# Patient Record
Sex: MALE | Race: WHITE | Employment: OTHER | URBAN - METROPOLITAN AREA
[De-identification: names, ages, dates, MRNs, and addresses within clinical notes are randomized per-mention and may not be internally consistent; named-entity substitution may affect disease eponyms.]

---

## 2019-03-18 ENCOUNTER — OFFICE VISIT (OUTPATIENT)
Dept: ENT CLINIC | Age: 79
End: 2019-03-18
Payer: MEDICARE

## 2019-03-18 VITALS
HEIGHT: 71 IN | DIASTOLIC BLOOD PRESSURE: 58 MMHG | WEIGHT: 212.8 LBS | TEMPERATURE: 97.2 F | HEART RATE: 81 BPM | SYSTOLIC BLOOD PRESSURE: 118 MMHG | BODY MASS INDEX: 29.79 KG/M2

## 2019-03-18 DIAGNOSIS — H61.23 BILATERAL IMPACTED CERUMEN: Primary | ICD-10-CM

## 2019-03-18 DIAGNOSIS — H90.2 EXTERNAL EAR CONDUCTIVE HEARING LOSS: ICD-10-CM

## 2019-03-18 PROCEDURE — 69210 REMOVE IMPACTED EAR WAX UNI: CPT | Performed by: OTOLARYNGOLOGY

## 2019-03-18 RX ORDER — GLIPIZIDE 10 MG/1
10 TABLET ORAL DAILY
COMMUNITY

## 2019-03-20 RX ORDER — CEFAZOLIN SODIUM 2 G/50ML
2 SOLUTION INTRAVENOUS ONCE
Status: CANCELLED | OUTPATIENT
Start: 2019-03-26 | End: 2019-03-20

## 2019-03-20 NOTE — PROGRESS NOTES
[facility] / [facility] ([facility]) [address] [facility], [address]    Acknowledgment of Informed Consent for Surgical or Medical Procedure and Sedation  I agree to allow doctor(s) [id number] Kaufman and his/her associates or assistants, including residents and/or other qualified medical practitioner to perform the following medical treatment or procedure and to administer or direct the administration of sedation as necessary:  Procedure(s): LEFT TOTAL KNEE ARTHROPLASTY  My doctor has explained the following regarding the proposed procedure:   the explanation of the procedure   the benefits of the procedure   the potential problems that might occur during recuperation   the risks and side effects of the procedure which could include but are not limited to severe blood loss, infection, stroke or death   the benefits, risks and side effect of alternative procedures including the consequences of declining this procedure or any alternative procedures   the likelihood of achieving satisfactory results. I acknowledge no guarantee or assurance has been made to me regarding the results. I understand that during the course of this treatment/procedure, unforeseen conditions can occur which require an additional or different procedure. I agree to allow my physician or assistants to perform such extension of the original procedure as they may find necessary. I understand that sedation will often result in temporary impairment of memory and fine motor skills and that sedation can occasionally progress to a state of deep sedation or general anesthesia. I understand the risks of anesthesia for surgery include, but are not limited to, sore throat, hoarseness, injury to face, mouth, or teeth; nausea; headache; injury to blood vessels or nerves; death, brain damage, or paralysis.     I understand that if I have a Limitation of Treatment order in effect during my hospitalization, the order may or may not be in effect during this procedure. I give my doctor permission to give me blood or blood products. I understand that there are risks with receiving blood such as hepatitis, AIDS, fever, or allergic reaction. I acknowledge that the risks, benefits, and alternatives of this treatment have been explained to me and that no express or implied warranty has been given by the hospital, any blood bank, or any person or entity as to the blood or blood components transfused. At the discretion of my doctor, I agree to allow observers, equipment/product representatives and allow photographing, and/or televising of the procedure, provided my name or identity is maintained confidentially. I agree the hospital may dispose of or use for scientific or educational purposes any tissue, fluid, or body parts which may be removed.     ________________________________Date________Time______ am/pm  ([facility])  Patient or Signature of Closest Relative or Legal Guardian    ________________________________Date________Time______am/pm      Page 1 of  1  Witness

## 2019-03-21 ENCOUNTER — HOSPITAL ENCOUNTER (OUTPATIENT)
Dept: PREADMISSION TESTING | Age: 79
Discharge: HOME | End: 2019-03-25
Payer: MEDICARE

## 2019-03-21 VITALS
HEART RATE: 63 BPM | HEIGHT: 72 IN | WEIGHT: 208 LBS | OXYGEN SATURATION: 96 % | DIASTOLIC BLOOD PRESSURE: 68 MMHG | BODY MASS INDEX: 28.17 KG/M2 | RESPIRATION RATE: 16 BRPM | TEMPERATURE: 98.8 F | SYSTOLIC BLOOD PRESSURE: 113 MMHG

## 2019-03-21 LAB
A/G RATIO: 1.5 (ref 1.1–2.2)
ALBUMIN SERPL-MCNC: 4.1 G/DL (ref 3.4–5)
ALP BLD-CCNC: 77 U/L (ref 40–129)
ALT SERPL-CCNC: 20 U/L (ref 10–40)
ANION GAP SERPL CALCULATED.3IONS-SCNC: 12 MMOL/L (ref 3–16)
APTT: 35.1 SEC (ref 26–36)
AST SERPL-CCNC: 15 U/L (ref 15–37)
BASOPHILS ABSOLUTE: 0.1 K/UL (ref 0–0.2)
BASOPHILS RELATIVE PERCENT: 0.8 %
BILIRUB SERPL-MCNC: 0.9 MG/DL (ref 0–1)
BUN BLDV-MCNC: 30 MG/DL (ref 7–20)
CALCIUM SERPL-MCNC: 9.6 MG/DL (ref 8.3–10.6)
CHLORIDE BLD-SCNC: 100 MMOL/L (ref 99–110)
CO2: 24 MMOL/L (ref 21–32)
CREAT SERPL-MCNC: 1 MG/DL (ref 0.8–1.3)
EOSINOPHILS ABSOLUTE: 0.3 K/UL (ref 0–0.6)
EOSINOPHILS RELATIVE PERCENT: 3.3 %
GFR AFRICAN AMERICAN: >60
GFR NON-AFRICAN AMERICAN: >60
GLOBULIN: 2.8 G/DL
GLUCOSE BLD-MCNC: 208 MG/DL (ref 70–99)
HCT VFR BLD CALC: 41.5 % (ref 40.5–52.5)
HEMOGLOBIN: 13.7 G/DL (ref 13.5–17.5)
INR BLD: 0.98 (ref 0.86–1.14)
LYMPHOCYTES ABSOLUTE: 1.6 K/UL (ref 1–5.1)
LYMPHOCYTES RELATIVE PERCENT: 17.2 %
MCH RBC QN AUTO: 27.2 PG (ref 26–34)
MCHC RBC AUTO-ENTMCNC: 33 G/DL (ref 31–36)
MCV RBC AUTO: 82.2 FL (ref 80–100)
MONOCYTES ABSOLUTE: 0.6 K/UL (ref 0–1.3)
MONOCYTES RELATIVE PERCENT: 6.1 %
NEUTROPHILS ABSOLUTE: 6.8 K/UL (ref 1.7–7.7)
NEUTROPHILS RELATIVE PERCENT: 72.6 %
PDW BLD-RTO: 15.8 % (ref 12.4–15.4)
PLATELET # BLD: 234 K/UL (ref 135–450)
PMV BLD AUTO: 7.5 FL (ref 5–10.5)
POTASSIUM SERPL-SCNC: 5 MMOL/L (ref 3.5–5.1)
PROTHROMBIN TIME: 11.2 SEC (ref 9.8–13)
RBC # BLD: 5.04 M/UL (ref 4.2–5.9)
SODIUM BLD-SCNC: 136 MMOL/L (ref 136–145)
TOTAL PROTEIN: 6.9 G/DL (ref 6.4–8.2)
WBC # BLD: 9.3 K/UL (ref 4–11)

## 2019-03-21 PROCEDURE — 85025 COMPLETE CBC W/AUTO DIFF WBC: CPT

## 2019-03-21 PROCEDURE — 87641 MR-STAPH DNA AMP PROBE: CPT

## 2019-03-21 PROCEDURE — 80053 COMPREHEN METABOLIC PANEL: CPT

## 2019-03-21 PROCEDURE — 85730 THROMBOPLASTIN TIME PARTIAL: CPT

## 2019-03-21 PROCEDURE — 85610 PROTHROMBIN TIME: CPT

## 2019-03-21 RX ORDER — LISINOPRIL 10 MG/1
10 TABLET ORAL DAILY
COMMUNITY
End: 2022-07-27 | Stop reason: ALTCHOICE

## 2019-03-21 RX ORDER — SERTRALINE HYDROCHLORIDE 100 MG/1
100 TABLET, FILM COATED ORAL DAILY
COMMUNITY

## 2019-03-21 RX ORDER — ATORVASTATIN CALCIUM 80 MG/1
80 TABLET, FILM COATED ORAL DAILY
COMMUNITY

## 2019-03-21 ASSESSMENT — PAIN DESCRIPTION - PAIN TYPE: TYPE: CHRONIC PAIN

## 2019-03-21 ASSESSMENT — PAIN DESCRIPTION - FREQUENCY: FREQUENCY: CONTINUOUS

## 2019-03-21 ASSESSMENT — PAIN DESCRIPTION - LOCATION: LOCATION: NOSE

## 2019-03-21 ASSESSMENT — PAIN SCALES - GENERAL: PAINLEVEL_OUTOF10: 8

## 2019-03-21 ASSESSMENT — PAIN DESCRIPTION - ORIENTATION: ORIENTATION: LEFT

## 2019-03-21 ASSESSMENT — PAIN DESCRIPTION - DESCRIPTORS: DESCRIPTORS: CONSTANT

## 2019-03-22 LAB — MRSA SCREEN RT-PCR: NORMAL

## 2019-03-25 ENCOUNTER — ANESTHESIA EVENT (OUTPATIENT)
Dept: OPERATING ROOM | Age: 79
DRG: 470 | End: 2019-03-25
Payer: MEDICARE

## 2019-03-26 ENCOUNTER — HOSPITAL ENCOUNTER (INPATIENT)
Age: 79
LOS: 3 days | Discharge: HOME | DRG: 470 | End: 2019-03-29
Attending: ORTHOPAEDIC SURGERY | Admitting: ORTHOPAEDIC SURGERY
Payer: MEDICARE

## 2019-03-26 ENCOUNTER — APPOINTMENT (OUTPATIENT)
Dept: GENERAL RADIOLOGY | Age: 79
DRG: 470 | End: 2019-03-26
Attending: ORTHOPAEDIC SURGERY
Payer: MEDICARE

## 2019-03-26 ENCOUNTER — ANESTHESIA (OUTPATIENT)
Dept: OPERATING ROOM | Age: 79
DRG: 470 | End: 2019-03-26
Payer: MEDICARE

## 2019-03-26 VITALS — OXYGEN SATURATION: 100 % | SYSTOLIC BLOOD PRESSURE: 115 MMHG | DIASTOLIC BLOOD PRESSURE: 60 MMHG

## 2019-03-26 DIAGNOSIS — M17.12 ARTHRITIS OF LEFT KNEE: Primary | ICD-10-CM

## 2019-03-26 LAB
GLUCOSE BLD-MCNC: 165 MG/DL (ref 70–99)
PERFORMED ON: ABNORMAL

## 2019-03-26 PROCEDURE — 1200000000 HC SEMI PRIVATE

## 2019-03-26 PROCEDURE — 6360000002 HC RX W HCPCS: Performed by: NURSE ANESTHETIST, CERTIFIED REGISTERED

## 2019-03-26 PROCEDURE — 3600000015 HC SURGERY LEVEL 5 ADDTL 15MIN: Performed by: ORTHOPAEDIC SURGERY

## 2019-03-26 PROCEDURE — 7100000000 HC PACU RECOVERY - FIRST 15 MIN: Performed by: ORTHOPAEDIC SURGERY

## 2019-03-26 PROCEDURE — 6360000002 HC RX W HCPCS: Performed by: ANESTHESIOLOGY

## 2019-03-26 PROCEDURE — 2580000003 HC RX 258: Performed by: ANESTHESIOLOGY

## 2019-03-26 PROCEDURE — 2500000003 HC RX 250 WO HCPCS: Performed by: ANESTHESIOLOGY

## 2019-03-26 PROCEDURE — 2500000003 HC RX 250 WO HCPCS: Performed by: ORTHOPAEDIC SURGERY

## 2019-03-26 PROCEDURE — 6370000000 HC RX 637 (ALT 250 FOR IP): Performed by: ORTHOPAEDIC SURGERY

## 2019-03-26 PROCEDURE — 73560 X-RAY EXAM OF KNEE 1 OR 2: CPT

## 2019-03-26 PROCEDURE — 6360000002 HC RX W HCPCS: Performed by: ORTHOPAEDIC SURGERY

## 2019-03-26 PROCEDURE — C1776 JOINT DEVICE (IMPLANTABLE): HCPCS | Performed by: ORTHOPAEDIC SURGERY

## 2019-03-26 PROCEDURE — 0SRD0J9 REPLACEMENT OF LEFT KNEE JOINT WITH SYNTHETIC SUBSTITUTE, CEMENTED, OPEN APPROACH: ICD-10-PCS | Performed by: ORTHOPAEDIC SURGERY

## 2019-03-26 PROCEDURE — 2709999900 HC NON-CHARGEABLE SUPPLY: Performed by: ORTHOPAEDIC SURGERY

## 2019-03-26 PROCEDURE — 94761 N-INVAS EAR/PLS OXIMETRY MLT: CPT

## 2019-03-26 PROCEDURE — 7100000001 HC PACU RECOVERY - ADDTL 15 MIN: Performed by: ORTHOPAEDIC SURGERY

## 2019-03-26 PROCEDURE — 2580000003 HC RX 258: Performed by: ORTHOPAEDIC SURGERY

## 2019-03-26 PROCEDURE — C1713 ANCHOR/SCREW BN/BN,TIS/BN: HCPCS | Performed by: ORTHOPAEDIC SURGERY

## 2019-03-26 PROCEDURE — 3600000005 HC SURGERY LEVEL 5 BASE: Performed by: ORTHOPAEDIC SURGERY

## 2019-03-26 PROCEDURE — 3700000001 HC ADD 15 MINUTES (ANESTHESIA): Performed by: ORTHOPAEDIC SURGERY

## 2019-03-26 PROCEDURE — 2720000010 HC SURG SUPPLY STERILE: Performed by: ORTHOPAEDIC SURGERY

## 2019-03-26 PROCEDURE — 3700000000 HC ANESTHESIA ATTENDED CARE: Performed by: ORTHOPAEDIC SURGERY

## 2019-03-26 PROCEDURE — 94664 DEMO&/EVAL PT USE INHALER: CPT

## 2019-03-26 PROCEDURE — 94150 VITAL CAPACITY TEST: CPT

## 2019-03-26 DEVICE — IMPLANTABLE DEVICE: Type: IMPLANTABLE DEVICE | Site: KNEE | Status: FUNCTIONAL

## 2019-03-26 DEVICE — COMPONENT TOT KNEE CAPPED FIX BEAR ATTUNE: Type: IMPLANTABLE DEVICE | Site: KNEE | Status: FUNCTIONAL

## 2019-03-26 DEVICE — INSERT TIB UPCHARGEBLE ATTUNE: Type: IMPLANTABLE DEVICE | Site: KNEE | Status: FUNCTIONAL

## 2019-03-26 DEVICE — BASEPLATE TIB SZ 6 FIX BEAR CEM ATTUNE: Type: IMPLANTABLE DEVICE | Site: KNEE | Status: FUNCTIONAL

## 2019-03-26 DEVICE — CEMENT BNE 20ML 41GM FULL DOSE PMMA W/ TOBRA M VISC RADPQ: Type: IMPLANTABLE DEVICE | Site: KNEE | Status: FUNCTIONAL

## 2019-03-26 DEVICE — COMPONENT PAT DIA41MM POLYETH DOME CEM MEDIALIZED ATTUNE: Type: IMPLANTABLE DEVICE | Site: KNEE | Status: FUNCTIONAL

## 2019-03-26 RX ORDER — LISINOPRIL 10 MG/1
10 TABLET ORAL DAILY
Status: DISCONTINUED | OUTPATIENT
Start: 2019-03-27 | End: 2019-03-28

## 2019-03-26 RX ORDER — CEFAZOLIN SODIUM 2 G/50ML
2 SOLUTION INTRAVENOUS EVERY 8 HOURS
Status: COMPLETED | OUTPATIENT
Start: 2019-03-26 | End: 2019-03-27

## 2019-03-26 RX ORDER — SERTRALINE HYDROCHLORIDE 100 MG/1
100 TABLET, FILM COATED ORAL DAILY
Status: DISCONTINUED | OUTPATIENT
Start: 2019-03-26 | End: 2019-03-29 | Stop reason: HOSPADM

## 2019-03-26 RX ORDER — MORPHINE SULFATE 2 MG/ML
2 INJECTION, SOLUTION INTRAMUSCULAR; INTRAVENOUS
Status: DISCONTINUED | OUTPATIENT
Start: 2019-03-26 | End: 2019-03-27

## 2019-03-26 RX ORDER — ONDANSETRON 2 MG/ML
4 INJECTION INTRAMUSCULAR; INTRAVENOUS
Status: DISCONTINUED | OUTPATIENT
Start: 2019-03-26 | End: 2019-03-26 | Stop reason: HOSPADM

## 2019-03-26 RX ORDER — ASPIRIN 81 MG/1
81 TABLET ORAL DAILY
Status: DISCONTINUED | OUTPATIENT
Start: 2019-03-27 | End: 2019-03-29 | Stop reason: HOSPADM

## 2019-03-26 RX ORDER — MAGNESIUM HYDROXIDE 1200 MG/15ML
LIQUID ORAL CONTINUOUS PRN
Status: COMPLETED | OUTPATIENT
Start: 2019-03-26 | End: 2019-03-26

## 2019-03-26 RX ORDER — HYDROCODONE BITARTRATE AND ACETAMINOPHEN 10; 325 MG/1; MG/1
2 TABLET ORAL EVERY 4 HOURS PRN
Status: DISCONTINUED | OUTPATIENT
Start: 2019-03-26 | End: 2019-03-27

## 2019-03-26 RX ORDER — PROPOFOL 10 MG/ML
INJECTION, EMULSION INTRAVENOUS CONTINUOUS PRN
Status: DISCONTINUED | OUTPATIENT
Start: 2019-03-26 | End: 2019-03-26 | Stop reason: SDUPTHER

## 2019-03-26 RX ORDER — DEXTROSE MONOHYDRATE 25 G/50ML
12.5 INJECTION, SOLUTION INTRAVENOUS PRN
Status: DISCONTINUED | OUTPATIENT
Start: 2019-03-26 | End: 2019-03-29 | Stop reason: HOSPADM

## 2019-03-26 RX ORDER — DIPHENHYDRAMINE HYDROCHLORIDE 50 MG/ML
12.5 INJECTION INTRAMUSCULAR; INTRAVENOUS
Status: DISCONTINUED | OUTPATIENT
Start: 2019-03-26 | End: 2019-03-26 | Stop reason: HOSPADM

## 2019-03-26 RX ORDER — FENTANYL CITRATE 50 UG/ML
100 INJECTION, SOLUTION INTRAMUSCULAR; INTRAVENOUS ONCE
Status: COMPLETED | OUTPATIENT
Start: 2019-03-26 | End: 2019-03-26

## 2019-03-26 RX ORDER — DOCUSATE SODIUM 100 MG/1
100 CAPSULE, LIQUID FILLED ORAL 2 TIMES DAILY
Status: DISCONTINUED | OUTPATIENT
Start: 2019-03-26 | End: 2019-03-29 | Stop reason: HOSPADM

## 2019-03-26 RX ORDER — SODIUM CHLORIDE 9 MG/ML
INJECTION, SOLUTION INTRAVENOUS CONTINUOUS
Status: DISCONTINUED | OUTPATIENT
Start: 2019-03-26 | End: 2019-03-29 | Stop reason: HOSPADM

## 2019-03-26 RX ORDER — FENTANYL CITRATE 50 UG/ML
25 INJECTION, SOLUTION INTRAMUSCULAR; INTRAVENOUS EVERY 5 MIN PRN
Status: COMPLETED | OUTPATIENT
Start: 2019-03-26 | End: 2019-03-26

## 2019-03-26 RX ORDER — SODIUM CHLORIDE, SODIUM LACTATE, POTASSIUM CHLORIDE, CALCIUM CHLORIDE 600; 310; 30; 20 MG/100ML; MG/100ML; MG/100ML; MG/100ML
INJECTION, SOLUTION INTRAVENOUS CONTINUOUS
Status: DISCONTINUED | OUTPATIENT
Start: 2019-03-26 | End: 2019-03-26

## 2019-03-26 RX ORDER — NICOTINE POLACRILEX 4 MG
15 LOZENGE BUCCAL PRN
Status: DISCONTINUED | OUTPATIENT
Start: 2019-03-26 | End: 2019-03-29 | Stop reason: HOSPADM

## 2019-03-26 RX ORDER — ONDANSETRON 2 MG/ML
4 INJECTION INTRAMUSCULAR; INTRAVENOUS EVERY 6 HOURS PRN
Status: DISCONTINUED | OUTPATIENT
Start: 2019-03-26 | End: 2019-03-29 | Stop reason: HOSPADM

## 2019-03-26 RX ORDER — SODIUM CHLORIDE 0.9 % (FLUSH) 0.9 %
10 SYRINGE (ML) INJECTION PRN
Status: DISCONTINUED | OUTPATIENT
Start: 2019-03-26 | End: 2019-03-29 | Stop reason: HOSPADM

## 2019-03-26 RX ORDER — GLIPIZIDE 5 MG/1
10 TABLET ORAL DAILY
Status: DISCONTINUED | OUTPATIENT
Start: 2019-03-26 | End: 2019-03-29 | Stop reason: HOSPADM

## 2019-03-26 RX ORDER — TAMSULOSIN HYDROCHLORIDE 0.4 MG/1
0.4 CAPSULE ORAL DAILY
Status: DISCONTINUED | OUTPATIENT
Start: 2019-03-26 | End: 2019-03-27

## 2019-03-26 RX ORDER — HYDROCODONE BITARTRATE AND ACETAMINOPHEN 5; 325 MG/1; MG/1
1 TABLET ORAL EVERY 4 HOURS PRN
Qty: 42 TABLET | Refills: 0 | Status: SHIPPED | OUTPATIENT
Start: 2019-03-26 | End: 2019-03-27 | Stop reason: HOSPADM

## 2019-03-26 RX ORDER — MORPHINE SULFATE 2 MG/ML
4 INJECTION, SOLUTION INTRAMUSCULAR; INTRAVENOUS
Status: DISCONTINUED | OUTPATIENT
Start: 2019-03-26 | End: 2019-03-27

## 2019-03-26 RX ORDER — CEFAZOLIN SODIUM 2 G/50ML
2 SOLUTION INTRAVENOUS ONCE
Status: COMPLETED | OUTPATIENT
Start: 2019-03-26 | End: 2019-03-26

## 2019-03-26 RX ORDER — GLYCOPYRROLATE 0.2 MG/ML
0.1 INJECTION INTRAMUSCULAR; INTRAVENOUS ONCE
Status: COMPLETED | OUTPATIENT
Start: 2019-03-26 | End: 2019-03-26

## 2019-03-26 RX ORDER — MIDAZOLAM HYDROCHLORIDE 1 MG/ML
4 INJECTION INTRAMUSCULAR; INTRAVENOUS
Status: COMPLETED | OUTPATIENT
Start: 2019-03-26 | End: 2019-03-26

## 2019-03-26 RX ORDER — PROMETHAZINE HYDROCHLORIDE 25 MG/ML
6.25 INJECTION, SOLUTION INTRAMUSCULAR; INTRAVENOUS
Status: DISCONTINUED | OUTPATIENT
Start: 2019-03-26 | End: 2019-03-26 | Stop reason: HOSPADM

## 2019-03-26 RX ORDER — MEPERIDINE HYDROCHLORIDE 25 MG/ML
12.5 INJECTION INTRAMUSCULAR; INTRAVENOUS; SUBCUTANEOUS EVERY 5 MIN PRN
Status: DISCONTINUED | OUTPATIENT
Start: 2019-03-26 | End: 2019-03-26 | Stop reason: HOSPADM

## 2019-03-26 RX ORDER — HYDRALAZINE HYDROCHLORIDE 20 MG/ML
5 INJECTION INTRAMUSCULAR; INTRAVENOUS EVERY 10 MIN PRN
Status: DISCONTINUED | OUTPATIENT
Start: 2019-03-26 | End: 2019-03-26 | Stop reason: HOSPADM

## 2019-03-26 RX ORDER — CLOPIDOGREL BISULFATE 75 MG/1
75 TABLET ORAL DAILY
Status: DISCONTINUED | OUTPATIENT
Start: 2019-03-27 | End: 2019-03-29 | Stop reason: HOSPADM

## 2019-03-26 RX ORDER — ZOLPIDEM TARTRATE 5 MG/1
5 TABLET ORAL NIGHTLY PRN
Status: DISCONTINUED | OUTPATIENT
Start: 2019-03-26 | End: 2019-03-29 | Stop reason: HOSPADM

## 2019-03-26 RX ORDER — LIDOCAINE HYDROCHLORIDE 20 MG/ML
INJECTION, SOLUTION EPIDURAL; INFILTRATION; INTRACAUDAL; PERINEURAL PRN
Status: DISCONTINUED | OUTPATIENT
Start: 2019-03-26 | End: 2019-03-26 | Stop reason: SDUPTHER

## 2019-03-26 RX ORDER — ATORVASTATIN CALCIUM 80 MG/1
80 TABLET, FILM COATED ORAL NIGHTLY
Status: DISCONTINUED | OUTPATIENT
Start: 2019-03-26 | End: 2019-03-29 | Stop reason: HOSPADM

## 2019-03-26 RX ORDER — LABETALOL HYDROCHLORIDE 5 MG/ML
5 INJECTION, SOLUTION INTRAVENOUS EVERY 10 MIN PRN
Status: DISCONTINUED | OUTPATIENT
Start: 2019-03-26 | End: 2019-03-26 | Stop reason: HOSPADM

## 2019-03-26 RX ORDER — HYDROCODONE BITARTRATE AND ACETAMINOPHEN 10; 325 MG/1; MG/1
1 TABLET ORAL EVERY 4 HOURS PRN
Status: DISCONTINUED | OUTPATIENT
Start: 2019-03-26 | End: 2019-03-27

## 2019-03-26 RX ORDER — SODIUM CHLORIDE 0.9 % (FLUSH) 0.9 %
10 SYRINGE (ML) INJECTION EVERY 12 HOURS SCHEDULED
Status: DISCONTINUED | OUTPATIENT
Start: 2019-03-26 | End: 2019-03-29 | Stop reason: HOSPADM

## 2019-03-26 RX ORDER — DEXTROSE MONOHYDRATE 50 MG/ML
100 INJECTION, SOLUTION INTRAVENOUS PRN
Status: DISCONTINUED | OUTPATIENT
Start: 2019-03-26 | End: 2019-03-29 | Stop reason: HOSPADM

## 2019-03-26 RX ORDER — OXYCODONE HYDROCHLORIDE AND ACETAMINOPHEN 5; 325 MG/1; MG/1
1 TABLET ORAL ONCE
Status: DISCONTINUED | OUTPATIENT
Start: 2019-03-26 | End: 2019-03-26 | Stop reason: HOSPADM

## 2019-03-26 RX ADMIN — FENTANYL CITRATE 50 MCG: 50 INJECTION INTRAMUSCULAR; INTRAVENOUS at 09:58

## 2019-03-26 RX ADMIN — LIDOCAINE HYDROCHLORIDE 2 ML: 20 INJECTION, SOLUTION EPIDURAL; INFILTRATION; INTRACAUDAL; PERINEURAL at 12:07

## 2019-03-26 RX ADMIN — FENTANYL CITRATE 25 MCG: 50 INJECTION INTRAMUSCULAR; INTRAVENOUS at 14:23

## 2019-03-26 RX ADMIN — MORPHINE SULFATE 4 MG: 2 INJECTION, SOLUTION INTRAMUSCULAR; INTRAVENOUS at 21:01

## 2019-03-26 RX ADMIN — Medication 10 ML: at 21:01

## 2019-03-26 RX ADMIN — LIDOCAINE HYDROCHLORIDE 1 ML: 20 INJECTION, SOLUTION EPIDURAL; INFILTRATION; INTRACAUDAL; PERINEURAL at 11:17

## 2019-03-26 RX ADMIN — VANCOMYCIN HYDROCHLORIDE 1500 MG: 10 INJECTION, POWDER, LYOPHILIZED, FOR SOLUTION INTRAVENOUS at 10:08

## 2019-03-26 RX ADMIN — FENTANYL CITRATE 25 MCG: 50 INJECTION INTRAMUSCULAR; INTRAVENOUS at 14:46

## 2019-03-26 RX ADMIN — GLYCOPYRROLATE 0.1 MG: 0.2 INJECTION, SOLUTION INTRAMUSCULAR; INTRAVENOUS at 10:00

## 2019-03-26 RX ADMIN — SODIUM CHLORIDE, SODIUM LACTATE, POTASSIUM CHLORIDE, AND CALCIUM CHLORIDE: 600; 310; 30; 20 INJECTION, SOLUTION INTRAVENOUS at 10:41

## 2019-03-26 RX ADMIN — HYDROMORPHONE HYDROCHLORIDE 0.5 MG: 1 INJECTION, SOLUTION INTRAMUSCULAR; INTRAVENOUS; SUBCUTANEOUS at 15:37

## 2019-03-26 RX ADMIN — LIDOCAINE HYDROCHLORIDE 5 ML: 20 INJECTION, SOLUTION EPIDURAL; INFILTRATION; INTRACAUDAL; PERINEURAL at 10:14

## 2019-03-26 RX ADMIN — DOCUSATE SODIUM 100 MG: 100 CAPSULE, LIQUID FILLED ORAL at 21:01

## 2019-03-26 RX ADMIN — ZOLPIDEM TARTRATE 5 MG: 5 TABLET ORAL at 22:33

## 2019-03-26 RX ADMIN — GLIPIZIDE 10 MG: 5 TABLET ORAL at 18:05

## 2019-03-26 RX ADMIN — TRANEXAMIC ACID 1 G: 1 INJECTION, SOLUTION INTRAVENOUS at 10:57

## 2019-03-26 RX ADMIN — MIDAZOLAM 2 MG: 1 INJECTION INTRAMUSCULAR; INTRAVENOUS at 09:59

## 2019-03-26 RX ADMIN — HYDROMORPHONE HYDROCHLORIDE 0.5 MG: 1 INJECTION, SOLUTION INTRAMUSCULAR; INTRAVENOUS; SUBCUTANEOUS at 13:38

## 2019-03-26 RX ADMIN — TAMSULOSIN HYDROCHLORIDE 0.4 MG: 0.4 CAPSULE ORAL at 18:05

## 2019-03-26 RX ADMIN — LIDOCAINE HYDROCHLORIDE 1 ML: 20 INJECTION, SOLUTION EPIDURAL; INFILTRATION; INTRACAUDAL; PERINEURAL at 11:30

## 2019-03-26 RX ADMIN — HYDROMORPHONE HYDROCHLORIDE 0.5 MG: 1 INJECTION, SOLUTION INTRAMUSCULAR; INTRAVENOUS; SUBCUTANEOUS at 13:45

## 2019-03-26 RX ADMIN — CEFAZOLIN SODIUM 2 G: 2 SOLUTION INTRAVENOUS at 18:07

## 2019-03-26 RX ADMIN — LIDOCAINE HYDROCHLORIDE 5 ML: 20 INJECTION, SOLUTION EPIDURAL; INFILTRATION; INTRACAUDAL; PERINEURAL at 10:31

## 2019-03-26 RX ADMIN — LIDOCAINE HYDROCHLORIDE 2 ML: 20 INJECTION, SOLUTION EPIDURAL; INFILTRATION; INTRACAUDAL; PERINEURAL at 12:31

## 2019-03-26 RX ADMIN — METFORMIN HYDROCHLORIDE 1000 MG: 1000 TABLET, FILM COATED ORAL at 18:05

## 2019-03-26 RX ADMIN — ATORVASTATIN CALCIUM 80 MG: 80 TABLET, FILM COATED ORAL at 21:01

## 2019-03-26 RX ADMIN — FENTANYL CITRATE 25 MCG: 50 INJECTION INTRAMUSCULAR; INTRAVENOUS at 14:17

## 2019-03-26 RX ADMIN — SODIUM CHLORIDE, POTASSIUM CHLORIDE, SODIUM LACTATE AND CALCIUM CHLORIDE: 600; 310; 30; 20 INJECTION, SOLUTION INTRAVENOUS at 09:16

## 2019-03-26 RX ADMIN — HYDROCODONE BITARTRATE AND ACETAMINOPHEN 1 TABLET: 10; 325 TABLET ORAL at 19:09

## 2019-03-26 RX ADMIN — PROPOFOL 100 MCG/KG/MIN: 10 INJECTION, EMULSION INTRAVENOUS at 11:03

## 2019-03-26 RX ADMIN — CEFAZOLIN SODIUM 2 G: 2 SOLUTION INTRAVENOUS at 10:59

## 2019-03-26 RX ADMIN — LIDOCAINE HYDROCHLORIDE 2 ML: 20 INJECTION, SOLUTION EPIDURAL; INFILTRATION; INTRACAUDAL; PERINEURAL at 12:43

## 2019-03-26 RX ADMIN — LIDOCAINE HYDROCHLORIDE 5 ML: 20 INJECTION, SOLUTION EPIDURAL; INFILTRATION; INTRACAUDAL; PERINEURAL at 10:20

## 2019-03-26 RX ADMIN — SODIUM CHLORIDE, SODIUM LACTATE, POTASSIUM CHLORIDE, AND CALCIUM CHLORIDE: 600; 310; 30; 20 INJECTION, SOLUTION INTRAVENOUS at 13:09

## 2019-03-26 RX ADMIN — LIDOCAINE HYDROCHLORIDE 1 ML: 20 INJECTION, SOLUTION EPIDURAL; INFILTRATION; INTRACAUDAL; PERINEURAL at 11:02

## 2019-03-26 RX ADMIN — FENTANYL CITRATE 25 MCG: 50 INJECTION INTRAMUSCULAR; INTRAVENOUS at 14:29

## 2019-03-26 ASSESSMENT — PULMONARY FUNCTION TESTS
PIF_VALUE: 0
PIF_VALUE: 1
PIF_VALUE: 0

## 2019-03-26 ASSESSMENT — PAIN DESCRIPTION - ORIENTATION
ORIENTATION: LEFT
ORIENTATION: LEFT

## 2019-03-26 ASSESSMENT — PAIN DESCRIPTION - FREQUENCY: FREQUENCY: CONTINUOUS

## 2019-03-26 ASSESSMENT — PAIN SCALES - GENERAL
PAINLEVEL_OUTOF10: 5
PAINLEVEL_OUTOF10: 9
PAINLEVEL_OUTOF10: 10
PAINLEVEL_OUTOF10: 7
PAINLEVEL_OUTOF10: 3
PAINLEVEL_OUTOF10: 6
PAINLEVEL_OUTOF10: 10
PAINLEVEL_OUTOF10: 8
PAINLEVEL_OUTOF10: 9
PAINLEVEL_OUTOF10: 6
PAINLEVEL_OUTOF10: 5
PAINLEVEL_OUTOF10: 4
PAINLEVEL_OUTOF10: 8

## 2019-03-26 ASSESSMENT — PAIN DESCRIPTION - PROGRESSION: CLINICAL_PROGRESSION: NOT CHANGED

## 2019-03-26 ASSESSMENT — PAIN DESCRIPTION - DESCRIPTORS
DESCRIPTORS: ACHING;CONSTANT
DESCRIPTORS: ACHING;CRUSHING;DISCOMFORT;DULL

## 2019-03-26 ASSESSMENT — PAIN DESCRIPTION - ONSET: ONSET: ON-GOING

## 2019-03-26 ASSESSMENT — PAIN DESCRIPTION - PAIN TYPE
TYPE: ACUTE PAIN;SURGICAL PAIN
TYPE: SURGICAL PAIN

## 2019-03-26 ASSESSMENT — PAIN DESCRIPTION - DIRECTION: RADIATING_TOWARDS: SHIN

## 2019-03-26 ASSESSMENT — PAIN DESCRIPTION - LOCATION
LOCATION: KNEE
LOCATION: KNEE

## 2019-03-26 ASSESSMENT — PAIN - FUNCTIONAL ASSESSMENT: PAIN_FUNCTIONAL_ASSESSMENT: 0-10

## 2019-03-26 NOTE — PROGRESS NOTES
Epidural block performed per Dr Dalia Patterson in room; pt tolerated procedure very well with no complaints or complications. VSS throughout the procedure.

## 2019-03-26 NOTE — OP NOTE
PREOPERATIVE DIAGNOSIS: Left knee tricompartmental arthrosis. POSTOPERATIVE DIAGNOSIS: Left knee tricompartmental arthrosis. PROCEDURE(S) PERFORMED: Left total knee arthroplasty, cemented,  cruciate-retaining. SURGEON: Jana Craig M.D. FIRST SURGICAL ASSISTANT: Bob Redman, [facility]. The Physician Assistant was necessary to perform the surgery today by assisting with application of implants and manipulation of the extremity. This help was not otherwise available in the operating room today. PRE-OPERATIVE ANTIBIOTICS:  Cefazolin 2G  + 1G Vancomycin    ANESTHESIA:  Epidural + Ortho Mix    ESTIMATED BLOOD LOSS: 50 cc     INTRAVENOUS FLUIDS: 1000 cc    URINE OUTPUT: 0 cc    TOURNIQUET TIME: 78 minutes at 493 mm Hg    COMPLICATIONS: None. SPECIMENS: None. IMPLANTS:   Depuy Attune, size 7 left femur  Depuy Attune primary fixed tibia baseplate, size 6  Depuy medial dome all-polyethylene patella, size 41   Depuy Attune Cruciate-retaining fixed bearing AOX polyethylene insert, size 5 mm     OPERATIVE INDICATIONS: This patient has longstanding knee pain. They have attempted to treat this nonoperatively for years. At this point they have exhausted all nonoperative options, and have persistent pain and dysfunction. I offered them a total knee arthroplasty for treatment of their symptomatic arthrosis. The risks and benefits of total knee arthroplasty were clearly explained to the patient, and they elected to proceed  despite these risks. DETAILS OF PROCEDURE: The patient was greeted in the preoperative holding  area, and their operative extremity was identified and marked with an  indelible marker. The patient was transported to the operating theater where  anesthesia was obtained. The patient was placed supine on the operating table  table. A bump was applied under the ipsilateral hip. A tourniquet was applied  to the thigh.  The operative  extremity was prepped and draped in a standard sterile surgical fashion. The patient received intravenous antibiotics prior to the inflation of the tourniquet and  surgical incision. A final timeout was performed, verifying correct patient, operative site and operative plan. The leg was exanguinated with an Ace bandage and the tourniquet was inflated. A medial-based incision was placed on the anterior knee. The extensor retinaculum was exposed, and a low mid-vastus approach was utilized to access the intraarticular space. Eburnated and arthritic bone was encountered. A small medial release was performed followed by resection of osteophytes from the medial plateau. The intra-patellar fat bad was resected in extension. The anterior cruciate ligament was resected and a drill was utilized to access the femoral canal.  An flexible intramedullary distal femoral cutting guide was applied, and a distal femoral cut was made in 4 degrees of valgus. The knee was taken into extension where the patella was recessed. The patella was then sized and drill holes placed. Osteophytes were removed from the lateral facet and trial patella button was placed. This was followed by application of a patella protector. An extramedullary tibia alignment guide was pinned perpendicular to the long axis of the tibia with approximately 7 degrees of posterior slope. Care was taken to preserve the  bone island for the posterior cruciate ligament. The tibia resection was removed in medial and lateral segments followed by excision of the medial and lateral menisci. The femoral sizing guide was pinned in 3 degrees of external rotation. After sizing the femur, drill holes were placed and the sizing guide was removed. The size 7 4-in-1 cutting guide was pinned in placed followed by anterior, posterior and chamfer resections with an oscillating saw. The 4-in-1 guide was removed.   Laminar  was placed with the femur in flexion and posterior osteophytes were removed from the femur The patient was extubated, transferred to a hospital bed and transported to the post-operative anesthesia care unit in stable condition. All sponge and needle counts were correct x 2.

## 2019-03-26 NOTE — H&P
[address] [facility]    [id number]    [facility] Same Day Surgery Update H & P  Department of General Surgery   Surgical Service   Pre-operative History and Physical  Last H & P within the last 30 days. DIAGNOSIS:   UNILATERAL PRIMARY OSTEOARTHRITIS LEFT KNEE    PROCEDURE:  KY TOTAL KNEE ARTHROPLASTY [65096] (LEFT TOTAL KNEE ARTHROPLASTY)     HISTORY OF PRESENT ILLNESS:    Patient with chronic left knee pain and swelling in the setting of arthrosis. The symptoms have been recalcitrant to conservative treatment and the patient presents today for the above procedure.      Past Medical History:        Diagnosis Date    Arthritis     CAD (coronary artery disease)     Diabetes mellitus ([address]     Hearing loss     Hyperlipidemia     Hypertension     Kidney stone      Past Surgical History:        Procedure Laterality Date    CARDIAC SURGERY      6 STENTS in one artery     CARPAL TUNNEL RELEASE Bilateral     CORONARY ARTERY BYPASS GRAFT  2005    double     JOINT REPLACEMENT Right 2010    knee    PACEMAKER INSERTION  01/2017    [employer]     PARATHYROIDECTOMY       Past Social History:  Social History     Socioeconomic History    Marital status:      Spouse name: Not on file    Number of children: Not on file    Years of education: Not on file    Highest education level: Not on file   Occupational History    Not on file   Social Needs    Financial resource strain: Not on file    Food insecurity:     Worry: Not on file     Inability: Not on file    Transportation needs:     Medical: Not on file     Non-medical: Not on file   Tobacco Use    Smoking status: Former Smoker    Smokeless tobacco: Never Used   Substance and Sexual Activity    Alcohol use: Not Currently     Comment: rarely     Drug use: No    Sexual activity: Not on file   Lifestyle    Physical activity:     Days per week: Not on file     Minutes per session: Not on file    Stress: Not on file   Relationships    Social connections:     Talks on phone: Not on file     Gets together: Not on file     Attends Mosque service: Not on file     Active member of club or organization: Not on file     Attends meetings of clubs or organizations: Not on file     Relationship status: Not on file    Intimate partner violence:     Fear of current or ex partner: Not on file     Emotionally abused: Not on file     Physically abused: Not on file     Forced sexual activity: Not on file   Other Topics Concern    Not on file   Social History Narrative    Not on file         Medications Prior to Admission:      Prior to Admission medications    Medication Sig Start Date End Date Taking? Authorizing Provider   atorvastatin (LIPITOR) 80 MG tablet Take 80 mg by mouth daily    Historical Provider, MD   lisinopril (PRINIVIL;ZESTRIL) 10 MG tablet Take 10 mg by mouth daily    Historical Provider, MD   sertraline (ZOLOFT) 100 MG tablet Take 100 mg by mouth daily    Historical Provider, MD   glipiZIDE (GLUCOTROL) 10 MG tablet Take 10 mg by mouth daily     Historical Provider, MD   tamsulosin (FLOMAX) 0.4 MG capsule Take 0.4 mg by mouth daily     Historical Provider, MD   aspirin 81 MG tablet Take 81 mg by mouth daily. Historical Provider, MD   metFORMIN (GLUCOPHAGE) 1000 MG tablet Take 1,000 mg by mouth 2 times daily (with meals)     Historical Provider, MD   clopidogrel (PLAVIX) 75 MG tablet Take 75 mg by mouth daily. Historical Provider, MD   zolpidem (AMBIEN) 10 MG tablet Take  by mouth nightly as needed. Historical Provider, MD         Allergies:  Patient has no known allergies.     PHYSICAL EXAM:      /71   Pulse 70   Temp 97.8 °F (36.6 °C) (Oral)   Resp 16   Ht 6' (1.829 m)   Wt 211 lb (95.7 kg)   SpO2 94%   BMI 28.62 kg/m²      Heart:  Regular rate and rhythm, No murmur noted    Lungs: No increased work of breathing, good air exchange, clear to ausculation bilaterally     Abdomen:  Soft, non-distended, non-tender, normal active bowel sounds, no masses palpated    ASSESSMENT AND PLAN:    1. Patient seen and focused exam done today- no new changes since last physical exam on 3/20/19    2. Access to ancillary services are available per request of the provider.     GURMEET Kolb - CNP     3/26/2019

## 2019-03-26 NOTE — PROGRESS NOTES
Incentive Spirometry education and demonstration completed by Respiratory Therapy. Turning over to Nursing for routine follow-up. Minimum Predicted Vital Capacity - 1210 mL. Patient's Actual Vital Capacity - 2000 mL.

## 2019-03-26 NOTE — PROGRESS NOTES
PACU Transfer Note    Vitals:    03/26/19 1600   BP: 125/69   Pulse: 61   Resp: 20   Temp: 97.6 °F (36.4 °C)   SpO2: 96%       In: 555 [P.O.:240; I.V.:315]  Out: -     Pain assessment:  {  Pain Level: 4    Report given to Receiving unit RN.  Caprice     3/26/2019 4:25 PM

## 2019-03-26 NOTE — PROGRESS NOTES
Patient alert/oriented x4. VSS throughout the shift. Dressing to operative knee clean, dry, and intact. Operative knee with ice applied. Pedal pulses +2 bilaterally. Plantar/Dorsi flexion strong bilaterally and denies numbness/tingling. Patient up with SBA and a walker. Patient tolerates activity well. Patients pain under control with oral pain medications (see Mar). Patients lungs clear throughout. 2000 on IS. Patient is voiding. Fall precautions are in place. Will continue to monitor.

## 2019-03-27 LAB
GLUCOSE BLD-MCNC: 117 MG/DL (ref 70–99)
GLUCOSE BLD-MCNC: 221 MG/DL (ref 70–99)
GLUCOSE BLD-MCNC: 232 MG/DL (ref 70–99)
GLUCOSE BLD-MCNC: 94 MG/DL (ref 70–99)
HCT VFR BLD CALC: 35.1 % (ref 40.5–52.5)
HEMOGLOBIN: 11.8 G/DL (ref 13.5–17.5)
PERFORMED ON: ABNORMAL
PERFORMED ON: NORMAL

## 2019-03-27 PROCEDURE — 6370000000 HC RX 637 (ALT 250 FOR IP): Performed by: PHYSICIAN ASSISTANT

## 2019-03-27 PROCEDURE — 51701 INSERT BLADDER CATHETER: CPT

## 2019-03-27 PROCEDURE — 2580000003 HC RX 258: Performed by: ORTHOPAEDIC SURGERY

## 2019-03-27 PROCEDURE — 85014 HEMATOCRIT: CPT

## 2019-03-27 PROCEDURE — 97110 THERAPEUTIC EXERCISES: CPT

## 2019-03-27 PROCEDURE — 97116 GAIT TRAINING THERAPY: CPT

## 2019-03-27 PROCEDURE — 97530 THERAPEUTIC ACTIVITIES: CPT

## 2019-03-27 PROCEDURE — 6370000000 HC RX 637 (ALT 250 FOR IP): Performed by: ORTHOPAEDIC SURGERY

## 2019-03-27 PROCEDURE — 85018 HEMOGLOBIN: CPT

## 2019-03-27 PROCEDURE — 97166 OT EVAL MOD COMPLEX 45 MIN: CPT

## 2019-03-27 PROCEDURE — 1200000000 HC SEMI PRIVATE

## 2019-03-27 PROCEDURE — 97161 PT EVAL LOW COMPLEX 20 MIN: CPT

## 2019-03-27 PROCEDURE — 6360000002 HC RX W HCPCS: Performed by: ORTHOPAEDIC SURGERY

## 2019-03-27 PROCEDURE — 51798 US URINE CAPACITY MEASURE: CPT

## 2019-03-27 PROCEDURE — 6360000002 HC RX W HCPCS: Performed by: PHYSICIAN ASSISTANT

## 2019-03-27 PROCEDURE — 97535 SELF CARE MNGMENT TRAINING: CPT

## 2019-03-27 PROCEDURE — 36415 COLL VENOUS BLD VENIPUNCTURE: CPT

## 2019-03-27 RX ORDER — OXYCODONE HYDROCHLORIDE 5 MG/1
10 TABLET ORAL EVERY 4 HOURS PRN
Status: DISCONTINUED | OUTPATIENT
Start: 2019-03-27 | End: 2019-03-29 | Stop reason: HOSPADM

## 2019-03-27 RX ORDER — ACETAMINOPHEN 500 MG
1000 TABLET ORAL 3 TIMES DAILY
Status: DISCONTINUED | OUTPATIENT
Start: 2019-03-27 | End: 2019-03-29 | Stop reason: HOSPADM

## 2019-03-27 RX ORDER — METHOCARBAMOL 750 MG/1
750 TABLET, FILM COATED ORAL 3 TIMES DAILY
Status: DISCONTINUED | OUTPATIENT
Start: 2019-03-27 | End: 2019-03-29 | Stop reason: HOSPADM

## 2019-03-27 RX ORDER — ACETAMINOPHEN 500 MG
1000 TABLET ORAL 3 TIMES DAILY PRN
Status: DISCONTINUED | OUTPATIENT
Start: 2019-03-27 | End: 2019-03-27

## 2019-03-27 RX ORDER — TAMSULOSIN HYDROCHLORIDE 0.4 MG/1
0.8 CAPSULE ORAL DAILY
Status: DISCONTINUED | OUTPATIENT
Start: 2019-03-28 | End: 2019-03-29 | Stop reason: HOSPADM

## 2019-03-27 RX ORDER — METHOCARBAMOL 750 MG/1
750 TABLET, FILM COATED ORAL 3 TIMES DAILY
Status: DISCONTINUED | OUTPATIENT
Start: 2019-03-27 | End: 2019-03-27

## 2019-03-27 RX ORDER — OXYCODONE HYDROCHLORIDE 5 MG/1
5 TABLET ORAL EVERY 4 HOURS PRN
Status: DISCONTINUED | OUTPATIENT
Start: 2019-03-27 | End: 2019-03-29 | Stop reason: HOSPADM

## 2019-03-27 RX ORDER — OXYCODONE HYDROCHLORIDE 5 MG/1
5 TABLET ORAL EVERY 6 HOURS PRN
Qty: 28 TABLET | Refills: 0 | Status: SHIPPED | OUTPATIENT
Start: 2019-03-27 | End: 2019-04-03

## 2019-03-27 RX ADMIN — ACETAMINOPHEN 1000 MG: 500 TABLET, FILM COATED ORAL at 08:19

## 2019-03-27 RX ADMIN — DOCUSATE SODIUM 100 MG: 100 CAPSULE, LIQUID FILLED ORAL at 21:00

## 2019-03-27 RX ADMIN — METHOCARBAMOL TABLETS 750 MG: 750 TABLET, COATED ORAL at 21:00

## 2019-03-27 RX ADMIN — SERTRALINE HYDROCHLORIDE 100 MG: 100 TABLET ORAL at 08:18

## 2019-03-27 RX ADMIN — Medication 10 ML: at 08:20

## 2019-03-27 RX ADMIN — OXYCODONE HYDROCHLORIDE 10 MG: 5 TABLET ORAL at 16:49

## 2019-03-27 RX ADMIN — DOCUSATE SODIUM 100 MG: 100 CAPSULE, LIQUID FILLED ORAL at 08:19

## 2019-03-27 RX ADMIN — OXYCODONE HYDROCHLORIDE 10 MG: 5 TABLET ORAL at 02:01

## 2019-03-27 RX ADMIN — TAMSULOSIN HYDROCHLORIDE 0.4 MG: 0.4 CAPSULE ORAL at 08:19

## 2019-03-27 RX ADMIN — MORPHINE SULFATE 4 MG: 2 INJECTION, SOLUTION INTRAMUSCULAR; INTRAVENOUS at 00:01

## 2019-03-27 RX ADMIN — METHOCARBAMOL TABLETS 750 MG: 750 TABLET, COATED ORAL at 01:45

## 2019-03-27 RX ADMIN — OXYCODONE HYDROCHLORIDE 10 MG: 5 TABLET ORAL at 21:01

## 2019-03-27 RX ADMIN — ATORVASTATIN CALCIUM 80 MG: 80 TABLET, FILM COATED ORAL at 21:01

## 2019-03-27 RX ADMIN — METFORMIN HYDROCHLORIDE 1000 MG: 1000 TABLET, FILM COATED ORAL at 08:18

## 2019-03-27 RX ADMIN — ACETAMINOPHEN 1000 MG: 500 TABLET, FILM COATED ORAL at 01:45

## 2019-03-27 RX ADMIN — ZOLPIDEM TARTRATE 5 MG: 5 TABLET ORAL at 21:00

## 2019-03-27 RX ADMIN — ASPIRIN 81 MG: 81 TABLET, COATED ORAL at 08:19

## 2019-03-27 RX ADMIN — OXYCODONE HYDROCHLORIDE 10 MG: 5 TABLET ORAL at 07:50

## 2019-03-27 RX ADMIN — OXYCODONE HYDROCHLORIDE 10 MG: 5 TABLET ORAL at 12:49

## 2019-03-27 RX ADMIN — ACETAMINOPHEN 1000 MG: 500 TABLET, FILM COATED ORAL at 12:49

## 2019-03-27 RX ADMIN — METHOCARBAMOL TABLETS 750 MG: 750 TABLET, COATED ORAL at 12:50

## 2019-03-27 RX ADMIN — METHOCARBAMOL TABLETS 750 MG: 750 TABLET, COATED ORAL at 08:18

## 2019-03-27 RX ADMIN — LISINOPRIL 10 MG: 10 TABLET ORAL at 08:19

## 2019-03-27 RX ADMIN — HYDROMORPHONE HYDROCHLORIDE 1 MG: 1 INJECTION, SOLUTION INTRAMUSCULAR; INTRAVENOUS; SUBCUTANEOUS at 03:03

## 2019-03-27 RX ADMIN — CLOPIDOGREL BISULFATE 75 MG: 75 TABLET ORAL at 08:19

## 2019-03-27 RX ADMIN — HYDROCODONE BITARTRATE AND ACETAMINOPHEN 2 TABLET: 10; 325 TABLET ORAL at 01:11

## 2019-03-27 RX ADMIN — GLIPIZIDE 10 MG: 5 TABLET ORAL at 08:19

## 2019-03-27 RX ADMIN — METFORMIN HYDROCHLORIDE 1000 MG: 1000 TABLET, FILM COATED ORAL at 16:50

## 2019-03-27 RX ADMIN — CEFAZOLIN SODIUM 2 G: 2 SOLUTION INTRAVENOUS at 03:03

## 2019-03-27 RX ADMIN — Medication 10 ML: at 21:03

## 2019-03-27 ASSESSMENT — PAIN DESCRIPTION - PAIN TYPE
TYPE: ACUTE PAIN;SURGICAL PAIN
TYPE: SURGICAL PAIN
TYPE: ACUTE PAIN;SURGICAL PAIN
TYPE: ACUTE PAIN;SURGICAL PAIN

## 2019-03-27 ASSESSMENT — PAIN DESCRIPTION - ONSET
ONSET: ON-GOING

## 2019-03-27 ASSESSMENT — PAIN DESCRIPTION - PROGRESSION
CLINICAL_PROGRESSION: NOT CHANGED

## 2019-03-27 ASSESSMENT — PAIN SCALES - GENERAL
PAINLEVEL_OUTOF10: 7
PAINLEVEL_OUTOF10: 0
PAINLEVEL_OUTOF10: 7
PAINLEVEL_OUTOF10: 10
PAINLEVEL_OUTOF10: 7
PAINLEVEL_OUTOF10: 10
PAINLEVEL_OUTOF10: 7
PAINLEVEL_OUTOF10: 10
PAINLEVEL_OUTOF10: 10
PAINLEVEL_OUTOF10: 8
PAINLEVEL_OUTOF10: 10
PAINLEVEL_OUTOF10: 7
PAINLEVEL_OUTOF10: 10

## 2019-03-27 ASSESSMENT — PAIN DESCRIPTION - DESCRIPTORS
DESCRIPTORS: ACHING
DESCRIPTORS: ACHING;CONSTANT;DISCOMFORT;SHARP
DESCRIPTORS: ACHING
DESCRIPTORS: ACHING;CONSTANT;DISCOMFORT;SHARP
DESCRIPTORS: ACHING;CONSTANT;DISCOMFORT
DESCRIPTORS: ACHING;CONSTANT;DISCOMFORT;SHARP

## 2019-03-27 ASSESSMENT — PAIN DESCRIPTION - LOCATION
LOCATION: KNEE

## 2019-03-27 ASSESSMENT — PAIN DESCRIPTION - ORIENTATION
ORIENTATION: LEFT

## 2019-03-27 ASSESSMENT — PAIN DESCRIPTION - FREQUENCY
FREQUENCY: CONTINUOUS

## 2019-03-27 ASSESSMENT — PAIN DESCRIPTION - DIRECTION: RADIATING_TOWARDS: SHIN

## 2019-03-27 NOTE — CONSULTS
3/27/2019  [facility])  Clinical Case Management Department    Initial Assessment     Patient: Ace Jarquin  MRN: [id number] / : 1940          Admission Documentation  Attending Provider: Mary Fortune MD  Admit date/time: 3/26/2019  8:13 AM  Status: Inpatient [101]  Diagnosis: Arthritis of left knee     Readmission within last 30 days:  no     Living Situation:  Discharge Planning  Living Arrangements: Spouse/Significant Other, Children  Support Systems: Spouse/Significant Other  Potential Assistance Needed: N/A  Type of Home Care Services: None    Written Assessment:   Juju Bynum is a 69 y.o. male on Mary Fortune MD service who was admitted on 3/26/2019 for left TKA. He has some knee pain but is more bothered by urinary retention and having to have a sinha placed. SW met with patient and spouse at bedside. SW introduced self and role as part of the team. Spouse reported in his current condition she would not be able to care for patient at home at discharge. She explored SNF with SW. SW noted that patient scored 17/24 and 18/24 and is on cusp of home discharge. PT to work with patient again tomorrow. Patient and spouse to talk with Saira BRADSHAW tomorrow regarding Home health care at discharge. Spouse is looking into private pay options for a home health aide. SW provided list of home health companies that provide non-skilled services. SW to follow up with patient and spouse tomorrow. Patient has needed DME at home. Walker, shower seat, cane, crutches. SW provided contact information to patient/family and placed information on white board in room should needs arise. No other needs noted at this time.        Service Assessment:       Values / Beliefs  Do you have any ethnic, cultural, sacramental, or spiritual Roman Catholic needs you would like us to be aware of while you are in the hospital?: No    Advance Directives (For Healthcare)  Pre-existing DNR Comfort Care/DNR Arrest/DNI Order:
Carpal tunnel release (Bilateral); parathyroidectomy; Cardiac surgery; and Total knee arthroplasty (Left, 3/26/2019). Medications:   acetaminophen  1,000 mg Oral TID    methocarbamol  750 mg Oral TID    aspirin  81 mg Oral Daily    atorvastatin  80 mg Oral Nightly    clopidogrel  75 mg Oral Daily    glipiZIDE  10 mg Oral Daily    lisinopril  10 mg Oral Daily    metFORMIN  1,000 mg Oral BID WC    sertraline  100 mg Oral Daily    tamsulosin  0.4 mg Oral Daily    sodium chloride flush  10 mL Intravenous 2 times per day    docusate sodium  100 mg Oral BID       Allergies:  No Known Allergies     Social History:   reports that he has quit smoking. He has never used smokeless tobacco. He reports that he drank alcohol. He reports that he does not use drugs. Family History:  family history includes Blindness in his father; Diabetes in his father; Heart Disease in his father. Physical Exam:  /67   Pulse 79   Temp 98.8 °F (37.1 °C) (Oral)   Resp 16   Ht 6' (1.829 m)   Wt 211 lb (95.7 kg)   SpO2 95%   BMI 28.62 kg/m²     General appearance: Appears comfortable. Well nourished  Eyes: conjunctiva clear  ENT: Moist mucus membranes  Neck: supple, no thyromegaly, no masses  Cardiovascular: Regular rhythm, normal S1, S2. No murmur, gallop, rub. Respiratory: Clear to auscultation bilaterally. No wheeze, ronchi or crackles. Good inspiratory effort  Gastrointestinal: Abdomen soft, not tender, not distended, normal bowel sounds  Musculoskeletal: strength symmetric upper and lower extremities  Neurologic: awake and alert, cooperative. Cranial nerves grossly intact, no facial asymmetry, speech normal, no motor deficits  Psychiatric: Normal affect. Alert and oriented to time, place and person.   Skin: Warm, dry; no rashes     Labs:  CBC:   Lab Results   Component Value Date    WBC 9.3 03/21/2019    RBC 5.04 03/21/2019    HGB 11.8 03/27/2019    HCT 35.1 03/27/2019    MCV 82.2 03/21/2019    MCH 27.2

## 2019-03-27 NOTE — PROGRESS NOTES
Patient a/o x4. Vitals stable. Pain difficult to control. MD notified and new orders placed. Ambulated in room and halls. Voiding as well as retaining urine. Patient refuses to be cath. Educated on risk of infection, still refuses cath. Not much sleep overnight. Patient expects to stay 2nd night. Will continue to monitor.

## 2019-03-27 NOTE — PROGRESS NOTES
Hearing loss, Hyperlipidemia, Hypertension, and Kidney stone. has a past surgical history that includes Pacemaker insertion (01/2017); Coronary artery bypass graft (2005); joint replacement (Right, 2010); Carpal tunnel release (Bilateral); parathyroidectomy; Cardiac surgery; and Total knee arthroplasty (Left, 3/26/2019). Restrictions  Position Activity Restriction  Other position/activity restrictions: Full WBAT  Vision/Hearing   Vision: impaired (Wears glasses for reading)  Hearing: Exceptions ([place]; hearing concerns. Wife states pt getting hearing aides)  Subjective  General  Chart Reviewed: Yes  Patient assessed for rehabilitation services?: Yes  Additional Pertinent Hx: Pt is a 69 y.o. male admitted to [facility] s/p L TKA. XR knee - neg fx. Pertinent PMH: Arthritis, CAD, DM, Hyperlipidemia, HTN, Cardiac Sx (6 stents), Carpal tunnel release (B), Coronary Artery bypass graft (2005), R TKA (2010), Pacemaker insertion (2017), Parathyroidectomy. Family / Caregiver Present: Yes(Wife)  Referring Practitioner: Raudel Arriaga MD  Referral Date : 03/26/19  Diagnosis: Arthritis of L knee  Subjective  Subjective: Pt seated in recliner with legs up upon arrival. Visibly in pain but agreeable to PT.   Pain Screening  Patient Currently in Pain: Yes(7/10 in L knee; RN aware)  Vital Signs  Patient Currently in Pain: Yes(7/10 in L knee; RN aware)       Orientation  Orientation  Overall Orientation Status: Within Functional Limits  Orientation Level: Oriented X4  Social/Functional History  Social/Functional History  Lives With: Spouse  Type of Home: (Condo)  Home Layout: Multi-level, Able to Live on Main level with bedroom/bathroom  Home Access: Stairs to enter with rails  Entrance Stairs - Number of Steps: 2  Bathroom Shower/Tub: Walk-in shower  Bathroom Toilet: Handicap height  Bathroom Equipment: Built-in shower seat, Hand-held shower  Home Equipment: Cane, Rolling walker, Crutches  ADL Assistance: Independent  Homemaking Assistance: Needs assistance(Pt does laundry)  Ambulation Assistance: Independent(occasionally used cane)  Transfer Assistance: Independent  Active : Yes  Occupation: Part time employment, Retired  Type of occupation: Car sales  Cognition   Cognition  Overall Cognitive Status: WFL  Following Commands: Follows one step commands with increased time; Follows one step commands with repetition  Attention Span: Attends with cues to redirect  Memory: Appears intact  Safety Judgement: Decreased awareness of need for assistance;Decreased awareness of need for safety  Problem Solving: Assistance required to correct errors made;Assistance required to identify errors made  Insights: Fully aware of deficits  Initiation: Requires cues for all  Sequencing: Requires cues for all    Objective     Observation/Palpation  Posture: Good(Forward flexed posture 2/2 increased pain)  Observation: Increased erythema in LLE  Edema: Increased swelling in LLE     AROM RLE (degrees)  RLE AROM: WFL  AROM LLE (degrees)  LLE General AROM: Ankle DF WFL; 70* Knee flexion; -10* from neutral Knee extension; Seated EOB (flexion) and in recliner (extension)  Strength RLE  Comment: Grossly >3+/5 at pt able to move against gravity without difficulty  Strength LLE  Comment: Not formally assessed 2/2 LTKA 3/26; Appears to be at least 3+/5        Bed mobility  Comment: Not assessed this date 2/2 pt being in recliner upon arrival  Transfers  Sit to Stand: Moderate Assistance(Effortful; increased time to complete; decreased WB on LLE; 5 trials)  Stand to sit: Moderate Assistance(decreased control into sitting; pt limited by pain and fear of movement.  Effortful and increased time to complete; 5 trials)  Ambulation  Ambulation?: Yes  Ambulation 1  Surface: level tile  Device: Rolling Walker  Assistance: Minimal assistance(Increased cueing for technique and safety. )  Quality of Gait: slowed, antalgic gait; cues for safety and technique; decreased stance time on LLE; forward flexed posture; decreased WB on LLE; step to gait pattern; cues to use RW correctly   Distance: 24'x1 (chair to commode); 6'x1 (commode to sink); 10'x1 (commode to EOB); 20'x1 (EOB to w/c); 4'x1 and 8'x1 (W/C to/from stairs); 30'x1 (w/c  to chair)   Comments: Pt fatigued quickly with ambulation and reports fatigue in his arms. Stairs: Yes  2 steps up/down with L handrail and crutch on left; step to pattern  Pt had increased difficulty and required moderate assist and cueing to step onto first step for stair negotiation. Unable to fully perform stepping onto second step and required mod/max assist to step down backwards for safety. Pt fearful of moving LLE and required maximal encouragement and effort.   Balance  Posture: Good  Sitting - Static: Good(supervision)  Standing - Static: Good;-(CGA)  Exercises  Ankle Pumps: x5 BLE   Treatment included: transfers, gt, stair negotiation, pt education    Plan   Plan  Times per week: 7  Times per day: Twice a day  Current Treatment Recommendations: Strengthening, ROM, Gait Training, Stair training, Endurance Training, Transfer Training, Functional Mobility Training, Balance Training, Patient/Caregiver Education & Training, Safety Education & Training  Safety Devices  Type of devices: Left in chair, Nurse notified, Chair alarm in place, Call light within reach, Gait belt, Patient at risk for falls(All needs met and within reach)    G-Code       OutComes Score                                                  AM-PAC Score  AM-PAC Inpatient Mobility Raw Score : 16  AM-PAC Inpatient T-Scale Score : 40.78  Mobility Inpatient CMS 0-100% Score: 54.16  Mobility Inpatient CMS G-Code Modifier : CK          Goals  Short term goals  Time Frame for Short term goals: By D/C  Short term goal 1: sit <> stand SBA  Short term goal 2: ambulate 27' with LRAD SBA   Short term goal 3: negotiate 3 steps with LRAD SBA       Therapy Time   Individual Concurrent Group Co-treatment Time In 1107         Time Out 1217         Minutes 70           Timed Code Treatment Minutes: 55      Total Treatment Minutes: 70       If pt d/c'd prior to next treatment, this note serves as a discharge note.       Kash Frey, Student Physical Therapist

## 2019-03-27 NOTE — PROGRESS NOTES
Physical Therapy  Facility/Department: [facility] 5T ORTHO/NEURO  Daily Treatment Note  NAME: Sulaiman Malik  : 1940  MRN: [id number]    Date of Service: 3/27/2019    Discharge Recommendations:    Sulaiman Malik scored a 17/24 on the AM-PAC short mobility form. Current research shows that an AM-PAC score of 17 or less is typically not associated with a discharge to the patient's home setting. Based on the patients AM-PAC score and their current functional mobility deficits, it is recommended that the patient have 3-5 sessions per week of Physical Therapy at d/c to increase the patients independence. Assessment: Pt continues to be below functional baseline. CGA and min assist required for transfers and mobility, however pain has decreased since previous session. Pt appears to be less fearful of movement and is able to ambulate with a more normalized gait pattern. Pt still not safe to ambulate alone and will continue to benefit from skilled IP PT at this time. PT Equipment Recommendations  Equipment Needed: No    Patient Diagnosis(es): The encounter diagnosis was Arthritis of left knee. has a past medical history of Arthritis, CAD (coronary artery disease), Diabetes mellitus ([facility] [address] Hearing loss, Hyperlipidemia, Hypertension, and Kidney stone. has a past surgical history that includes Pacemaker insertion (2017); Coronary artery bypass graft (); joint replacement (Right, ); Carpal tunnel release (Bilateral); parathyroidectomy; Cardiac surgery; and Total knee arthroplasty (Left, 3/26/2019). Restrictions  Position Activity Restriction  Other position/activity restrictions: Full WBAT  Subjective   General  Chart Reviewed: Yes  Additional Pertinent Hx: Pt is a 69 y.o. male admitted to [facility] s/p L TKA. XR knee - neg fx.  Pertinent PMH: Arthritis, CAD, DM, Hyperlipidemia, HTN, Cardiac Sx (6 stents), Carpal tunnel release (B), Coronary Artery bypass graft (), R TKA (), Pacemaker insertion (2017), Parathyroidectomy. Family / Caregiver Present: Yes(Wife)  Referring Practitioner: Anitha Escoto MD  Subjective  Subjective: Pt supine in bed with RN in room. Pt agreeable to PT  Pain Screening  Patient Currently in Pain: Yes(5/10 in L knee; RN aware)         Orientation  Orientation  Overall Orientation Status: Within Functional Limits  Orientation Level: Oriented X4  Cognition   Cognition  Overall Cognitive Status: WFL  Following Commands: Follows one step commands with increased time; Follows one step commands with repetition  Attention Span: Attends with cues to redirect  Memory: Appears intact  Safety Judgement: Decreased awareness of need for assistance;Decreased awareness of need for safety  Problem Solving: Assistance required to correct errors made;Assistance required to identify errors made  Insights: Fully aware of deficits  Initiation: Requires cues for all  Sequencing: Requires cues for all  Objective   Bed mobility  Supine to Sit: Stand by assistance(effortful and required increased time to complete)  Sit to Supine: Stand by assistance(effortful and required increased time to complete)  Scooting: Stand by assistance(in Trendelenburg position; pt only able to use LUE)  Comment: Not assessed this date 2/2 pt being in recliner upon arrival  Transfers  Sit to Stand: Minimal Assistance(Effortful and required increased time to complete)  Stand to sit: (Effortful and required increased time to complete)  Ambulation  Ambulation?: Yes  Ambulation 1  Surface: level tile  Device: Rolling Walker  Assistance: Contact guard assistance(Mild LOB with CGA for recovery;  Increased cues for technique and safety)  Quality of Gait: slowed, antalgic gait; cues for safety and technique; decreased stance time on LLE; forward flexed posture; decreased WB on LLE; step through gait pattern; cues to use RW correctly   Distance: 60'x1 (into hallway)  Comments: Pt fatigued in the UEs with ambulation   Stairs/Curb  Stairs?: No  Stairs  Rails: Left ascending     Balance  Posture: Good  Sitting - Static: Good(supervision)  Standing - Static: Good;-(CGA)  Exercises  Heelslides: x10 LLE  Gluteal Sets: x10 B  Knee Long Arc Quad: x10 LLE  Ankle Pumps: x10 BLE   Comments: Pt motivated but slightly limited by pain (5/10). Pt able to complete all exercises and verbalized understanding of all activities. AROM RLE (degrees)  RLE AROM: WFL  AROM LLE (degrees)  LLE General AROM: Ankle DF WFL; 70* Knee flexion; -10* from neutral Knee extension; Seated EOB (flexion) and in recliner (extension)  Strength RLE  Comment: Grossly >3+/5 at pt able to move against gravity without difficulty  Strength LLE  Comment: Not formally assessed 2/2 LTKA 3/26; Appears to be at least 3+/5                 Assessment   Body structures, Functions, Activity limitations: Decreased functional mobility ; Decreased ADL status; Decreased ROM; Decreased strength;Decreased safe awareness; Increased Pain;Decreased balance;Decreased endurance  Assessment: Pt continues to be below functional baseline. CGA and min assist required for transfers and mobility, however pain has decreased since previous session. Pt appears to be less fearful of movement and is able to ambulate with a more normalized gait pattern. Pt still not safe to ambulate alone and will continue to benefit from skilled IP PT at this time. Treatment Diagnosis: impaired functional mobility 2/2 increased pain and decreased balance. Decision Making: Low Complexity  Patient Education: role of PT, use of call light, use of AD, importance of OOB and movement, safety awareness, d/c planning. Pt and spouse verbalize understanding.   REQUIRES PT FOLLOW UP: Yes     G-Code     OutComes Score                                                  AM-PAC Score  AM-PAC Inpatient Mobility Raw Score : 17  AM-PAC Inpatient T-Scale Score : 42.13  Mobility Inpatient CMS 0-100% Score: 50.57  Mobility Inpatient CMS G-Code Modifier : CK Goals  Short term goals  Time Frame for Short term goals: By D/C  Short term goal 1: sit <> stand SBA;ongoing  Short term goal 2: ambulate 27' with LRAD SBA; partially met (3/27) ambulate 100' with LRAD SBA  Short term goal 3: negotiate 3 steps with LRAD SBA; ongoing    Plan    Plan  Times per week: 7  Times per day: Twice a day  Current Treatment Recommendations: Strengthening, ROM, Gait Training, Stair training, Endurance Training, Transfer Training, Functional Mobility Training, Balance Training, Patient/Caregiver Education & Training, Safety Education & Training  Safety Devices  Type of devices: Left in chair, Nurse notified, Chair alarm in place, Call light within reach, Gait belt, Patient at risk for falls(All needs met and within reach)     Therapy Time   Individual Concurrent Group Co-treatment   Time In 1525         Time Out 1551         Minutes 26           Timed Code Treatment Minutes: 26       Total Treatment Minutes:  26    If pt d/c'd prior to next treatment, this note serves as a discharge note.            Arno Gowers, Student Physical Therapist

## 2019-03-27 NOTE — PROGRESS NOTES
Department of Orthopedic Surgery     Progress Note    Subjective:   Admit Day:3/26/2019    Patient is comfortable appearing. Denies chest pain, shortness of air or calf pain. Tolerating PO. Objective[de-identified]    height is 6' (1.829 m) and weight is 211 lb (95.7 kg). His oral temperature is 98.8 °F (37.1 °C). His blood pressure is 111/67 and his pulse is 79. His respiration is 16 and oxygen saturation is 95%. General:  No acute distress. Operative Incision:  Dressing is clean, dry and intact. Operative Extremity:  - Motor function intact to Tibialis Anterior, Gastroc-Soleus Complex, Extensor Hallucis Longus, and Flexor Hallucis Longus.  -  Sensation intact to light touch in sural, saphenous, superficial peroneal, deep peroneal and tibial nerve distributions. -  Toes are wwp with a palpable dorsalis pedis pulse. Negative Tiffany's Sign Bilaterally    Labs:  Lab Results   Component Value Date    WBC 9.3 03/21/2019    HGB 11.8 03/27/2019    HCT 35.1 03/27/2019     03/21/2019       Lab Results   Component Value Date    INR 0.98 03/21/2019       Lab Results   Component Value Date     03/21/2019    K 5.0 03/21/2019    CO2 24 03/21/2019    BUN 30 03/21/2019    CREATININE 1.0 03/21/2019    CALCIUM 9.6 03/21/2019       Assessment:   Principal Problem:    Arthritis of left knee  Resolved Problems:    * No resolved hospital problems. *      POD # 1 s/p Left TKA. Patient complains of intolerable pain overnight. The on call PA was notified and did change him to IV Dilaudid which he states finally relieved his pain around 5:00AM this morning. He states his pain is well controlled for now. I discussed weaning off the IV pain medication with him today. He also has had issues with urinary retention overnight. This is a chronic problem for him for which he takes Flomax daily. He was refusing to be straight cathed for 900cc of retained urine.  I also discussed with him that he would be unable to be discharged until

## 2019-03-27 NOTE — PROGRESS NOTES
Pt bladder scanned for retention, greater than 999. A straight cath was preformed and returned 1000 ml. Pt states that he has an enlarged prostrate and there was some resistence and discomfort. No blood was noted with urinary return.

## 2019-03-27 NOTE — PROGRESS NOTES
Occupational Therapy   Occupational Therapy Initial Assessment/Treatment  Date: 3/27/2019   Patient Name: Shaina West  MRN: [id number]     : 1940    Date of Service: 3/27/2019    Discharge Recommendations:    Shaina West scored a 18/24 on the AM-PAC ADL Inpatient form. Current research shows that an AM-PAC score of 18 or greater is typically associated with a discharge to the patient's home setting. Based on the patients AM-PAC score and their current ADL deficits, it is recommended that the patient have 2-3 sessions per week of Occupational Therapy at d/c to increase the patients independence. OT Equipment Recommendations  Equipment Needed: Yes  Mobility Devices: ADL Assistive Devices  ADL Assistive Devices: Toileting - Raised Toilet Seat with arms    Assessment   Performance deficits / Impairments: Decreased functional mobility ; Decreased ADL status; Decreased endurance  Assessment: Pt presents with a decline in functional independence s/p TKR. Pt is from home with wife and is normally independent. Pt is currently requiring assist with ADL and mobility. Pt c/o increased pain interfering with activities. Anticipate good progress when pain is improved.   Treatment Diagnosis: Impaired ADL/functional mobiity  Prognosis: Good  Decision Making: Medium Complexity  Patient Education: Transfer techniques:  Pt demonstrated understanding  REQUIRES OT FOLLOW UP: Yes  Activity Tolerance  Activity Tolerance: Patient limited by pain  Safety Devices  Safety Devices in place: Not Applicable(Pt was left working with PT)           Treatment Diagnosis: Impaired ADL/functional mobiity      Restrictions  Position Activity Restriction  Other position/activity restrictions: Full WBAT    Subjective   General  Patient assessed for rehabilitation services?: Yes  Additional Pertinent Hx: 3/26/19 Lft TKR     PMH includes: arthritis, CAD, DM, HTN, cardiac stents, bilateral CTR, CABG, Rt TKR, pacemaker  Family / Caregiver sink)  LE Dressing: Moderate assistance  Toileting: Minimal assistance  Tone RUE  RUE Tone: Normotonic  Tone LUE  LUE Tone: Normotonic  Coordination  Movements Are Fluid And Coordinated: Yes        Transfers  Stand Step Transfers: Minimal assistance(+cues)  Sit to stand: Moderate assistance(+cues)  Stand to sit: Moderate assistance(+cues)     Cognition  Overall Cognitive Status: WFL  Following Commands: Follows one step commands with increased time; Follows one step commands with repetition  Attention Span: Attends with cues to redirect  Memory: Appears intact  Safety Judgement: Decreased awareness of need for assistance;Decreased awareness of need for safety  Problem Solving: Assistance required to correct errors made;Assistance required to identify errors made  Insights: Fully aware of deficits  Initiation: Requires cues for all  Sequencing: Requires cues for all                 LUE AROM (degrees)  LUE AROM : WFL  RUE AROM (degrees)  RUE AROM : WFL        Hand Dominance  Hand Dominance: Right     Treatment included ADL and transfer training. Plan   Plan  Times per week: 7  Times per day: Daily  Current Treatment Recommendations: Functional Mobility Training, Self-Care / ADL     If patient is discharged prior to next treatment, this note will serve as the discharge. AM-PAC Score        AM-[facility] Inpatient Daily Activity Raw Score: 18  AM-PAC Inpatient ADL T-Scale Score : 38.66  ADL Inpatient CMS 0-100% Score: 46.65  ADL Inpatient CMS G-Code Modifier : CK    Goals                        No goals met  Short term goals  Time Frame for Short term goals: Discharge  Short term goal 1: Transfer to/from 3 in 1 commode with SBA  Short term goal 2: Perform lower body dressing with min assist  Short term goal 3: Stance with SBA x5 min while engaging in ADL/functinal mobility  Patient Goals   Patient goals : Go home. Return to work.        Therapy Time   Individual Concurrent Group Co-treatment   Time In 0943         Time Out 1022         Minutes 39         Timed Code Treatment Minutes: 34 Minutes    Total Treatment [address], OTR/L [id number]

## 2019-03-28 LAB
GLUCOSE BLD-MCNC: 206 MG/DL (ref 70–99)
GLUCOSE BLD-MCNC: 233 MG/DL (ref 70–99)
GLUCOSE BLD-MCNC: 250 MG/DL (ref 70–99)
GLUCOSE BLD-MCNC: 260 MG/DL (ref 70–99)
PERFORMED ON: ABNORMAL

## 2019-03-28 PROCEDURE — 97530 THERAPEUTIC ACTIVITIES: CPT

## 2019-03-28 PROCEDURE — 97110 THERAPEUTIC EXERCISES: CPT

## 2019-03-28 PROCEDURE — 6370000000 HC RX 637 (ALT 250 FOR IP): Performed by: ORTHOPAEDIC SURGERY

## 2019-03-28 PROCEDURE — 6370000000 HC RX 637 (ALT 250 FOR IP): Performed by: PHYSICIAN ASSISTANT

## 2019-03-28 PROCEDURE — 6370000000 HC RX 637 (ALT 250 FOR IP): Performed by: FAMILY MEDICINE

## 2019-03-28 PROCEDURE — 97116 GAIT TRAINING THERAPY: CPT

## 2019-03-28 PROCEDURE — 1200000000 HC SEMI PRIVATE

## 2019-03-28 PROCEDURE — 2580000003 HC RX 258: Performed by: ORTHOPAEDIC SURGERY

## 2019-03-28 RX ADMIN — METHOCARBAMOL TABLETS 750 MG: 750 TABLET, COATED ORAL at 08:09

## 2019-03-28 RX ADMIN — METFORMIN HYDROCHLORIDE 1000 MG: 1000 TABLET, FILM COATED ORAL at 17:17

## 2019-03-28 RX ADMIN — METHOCARBAMOL TABLETS 750 MG: 750 TABLET, COATED ORAL at 21:25

## 2019-03-28 RX ADMIN — DOCUSATE SODIUM 100 MG: 100 CAPSULE, LIQUID FILLED ORAL at 08:08

## 2019-03-28 RX ADMIN — ACETAMINOPHEN 1000 MG: 500 TABLET, FILM COATED ORAL at 13:41

## 2019-03-28 RX ADMIN — ACETAMINOPHEN 1000 MG: 500 TABLET, FILM COATED ORAL at 08:09

## 2019-03-28 RX ADMIN — OXYCODONE HYDROCHLORIDE 10 MG: 5 TABLET ORAL at 01:00

## 2019-03-28 RX ADMIN — TAMSULOSIN HYDROCHLORIDE 0.8 MG: 0.4 CAPSULE ORAL at 08:07

## 2019-03-28 RX ADMIN — SERTRALINE HYDROCHLORIDE 100 MG: 100 TABLET ORAL at 08:08

## 2019-03-28 RX ADMIN — OXYCODONE HYDROCHLORIDE 10 MG: 5 TABLET ORAL at 10:15

## 2019-03-28 RX ADMIN — Medication 10 ML: at 21:26

## 2019-03-28 RX ADMIN — ASPIRIN 81 MG: 81 TABLET, COATED ORAL at 08:08

## 2019-03-28 RX ADMIN — DOCUSATE SODIUM 100 MG: 100 CAPSULE, LIQUID FILLED ORAL at 21:25

## 2019-03-28 RX ADMIN — GLIPIZIDE 10 MG: 5 TABLET ORAL at 08:08

## 2019-03-28 RX ADMIN — OXYCODONE HYDROCHLORIDE 10 MG: 5 TABLET ORAL at 05:44

## 2019-03-28 RX ADMIN — METHOCARBAMOL TABLETS 750 MG: 750 TABLET, COATED ORAL at 13:41

## 2019-03-28 RX ADMIN — ACETAMINOPHEN 1000 MG: 500 TABLET, FILM COATED ORAL at 21:25

## 2019-03-28 RX ADMIN — METFORMIN HYDROCHLORIDE 1000 MG: 1000 TABLET, FILM COATED ORAL at 08:08

## 2019-03-28 RX ADMIN — ATORVASTATIN CALCIUM 80 MG: 80 TABLET, FILM COATED ORAL at 21:25

## 2019-03-28 RX ADMIN — OXYCODONE HYDROCHLORIDE 10 MG: 5 TABLET ORAL at 15:46

## 2019-03-28 RX ADMIN — CLOPIDOGREL BISULFATE 75 MG: 75 TABLET ORAL at 08:08

## 2019-03-28 RX ADMIN — Medication 10 ML: at 08:07

## 2019-03-28 ASSESSMENT — PAIN SCALES - GENERAL
PAINLEVEL_OUTOF10: 2
PAINLEVEL_OUTOF10: 6
PAINLEVEL_OUTOF10: 2
PAINLEVEL_OUTOF10: 6
PAINLEVEL_OUTOF10: 7
PAINLEVEL_OUTOF10: 7
PAINLEVEL_OUTOF10: 4
PAINLEVEL_OUTOF10: 0
PAINLEVEL_OUTOF10: 8
PAINLEVEL_OUTOF10: 7
PAINLEVEL_OUTOF10: 2
PAINLEVEL_OUTOF10: 8
PAINLEVEL_OUTOF10: 0
PAINLEVEL_OUTOF10: 3
PAINLEVEL_OUTOF10: 2

## 2019-03-28 ASSESSMENT — PAIN DESCRIPTION - FREQUENCY
FREQUENCY: CONTINUOUS

## 2019-03-28 ASSESSMENT — PAIN DESCRIPTION - ONSET
ONSET: ON-GOING

## 2019-03-28 ASSESSMENT — PAIN DESCRIPTION - LOCATION
LOCATION: KNEE

## 2019-03-28 ASSESSMENT — PAIN DESCRIPTION - ORIENTATION
ORIENTATION: LEFT

## 2019-03-28 ASSESSMENT — PAIN DESCRIPTION - PROGRESSION
CLINICAL_PROGRESSION: NOT CHANGED

## 2019-03-28 ASSESSMENT — PAIN DESCRIPTION - DESCRIPTORS
DESCRIPTORS: ACHING
DESCRIPTORS: ACHING
DESCRIPTORS: ACHING;DISCOMFORT
DESCRIPTORS: ACHING
DESCRIPTORS: ACHING

## 2019-03-28 ASSESSMENT — PAIN DESCRIPTION - PAIN TYPE
TYPE: SURGICAL PAIN

## 2019-03-28 NOTE — PROGRESS NOTES
[facility]    3/28/2019 4:15 PM        Name: Amanda Gonzalez . Admitted: 3/26/2019  Primary Care Provider: Cosmo Lopez (Tel: [phone])      Subjective:      Catheter out and voiding well. A little goofy from his pain medication per his wife. No fevers. Not short of breath. No nausea or vomiting. Reviewed interval ancillary notes    Objective:  BP (!) 95/56   Pulse 78   Temp 99.2 °F (37.3 °C) (Oral)   Resp 16   Ht 6' (1.829 m)   Wt 211 lb (95.7 kg)   SpO2 96%   BMI 28.62 kg/m²     Intake/Output Summary (Last 24 hours) at 3/28/2019 1615  Last data filed at 3/28/2019 0815  Gross per 24 hour   Intake 240 ml   Output 415 ml   Net -175 ml      Wt Readings from Last 3 Encounters:   03/26/19 211 lb (95.7 kg)   03/21/19 208 lb (94.3 kg)   03/18/19 212 lb 12.8 oz (96.5 kg)       General appearance:  Appears comfortable, no distress  Head:  Atraumatic normocephalic  Neck: supple  Cardiovascular: Regular rhythm, normal S1, S2. No murmur. No edema in lower extremities  Respiratory: Not using accessory muscles. Good inspiratory effort. Clear to auscultation bilaterally, no wheeze or crackles. GI: Abdomen soft, no tenderness, not distended, normal bowel sounds  Musculoskeletal: strength symmetric  Neurology: awake and alert, cooperative      Labs and Tests:  CBC:   Recent Labs     03/27/19  0626   HGB 11.8*     BMP:  No results for input(s): NA, K, CL, CO2, BUN, CREATININE, GLUCOSE in the last 72 hours. Hepatic: No results for input(s): AST, ALT, ALB, BILITOT, ALKPHOS in the last 72 hours. Assessment & Plan:   Principal Problem:  Diabetes mellitus  Hypertension  Urinary retention    Arthritis of left knee  Resolved Problems:    * No resolved hospital problems. *       Continue flomax. Continue oral diabetes medications. Hold zestril due to low BP.     Diet: DIET GENERAL;  Code:Full Code  DVT PPX Per Ferrel Rinne, MD   3/28/2019 4:15 PM

## 2019-03-28 NOTE — PLAN OF CARE
Problem: Pain:  Goal: Pain level will decrease  Description  Pain level will decrease   3/28/2019 1224 by Mandy Rodriguez RN  Outcome: Ongoing  3/28/2019 1223 by Mandy Rodriguez RN  Reactivated  3/28/2019 1222 by Mandy Rodriguez RN  Outcome: Completed  3/28/2019 1052 by Mandy Rodriguez RN  Outcome: Completed  3/28/2019 0400 by Tresa Sweeney RN  Outcome: Met This Shift   Pt. States ongoing left knee pain post op. States po pain meds are helpful and requests them as soon as they are able to be given. Pt. States \"I want to stay ahead of the pain\". Problem: Discharge Planning:  Goal: Discharged to appropriate level of care  Description  Discharged to appropriate level of care   3/28/2019 1224 by Mandy Rodriguez RN  Outcome: Ongoing  3/28/2019 1223 by Mandy Rodriguez RN  Reactivated  3/28/2019 1222 by Mandy Rodriguez RN  Outcome: Completed  3/28/2019 1052 by Mandy Rodriguez RN  Outcome: Completed   Pt. Plan is to go home at discharge however pt. Unable to perform safely with PT/OT. PT/OT does not recommend a safe discharge home at this time. PA notified. Pt. To have second therapy session this afternoon. Will re-evaluate then per PA.

## 2019-03-28 NOTE — PROGRESS NOTES
VSS with exception to low grade temp overnight. Pt voided to urinal 200 cc 9 hours after being straight cath'd on day shift. Pt encouraged to drink more fluids. NV intact. Pt up x1 with walker and GB. Pt was up to chair for the first four hours of the shift and then returned to bed. Pt has ambulated 20 feet in room overnight and tolerated this fairly well. Pt expects re-eval with PT this AM. Dressing CDI and ice applied. Bed alarm set. Call light in reach. Will continue to monitor.      (Update : temp resolved at 0300 vitals check.)  Vitals:    03/28/19 0320   BP: 110/67   Pulse: 84   Resp: 16   Temp: 97.5 °F (36.4 °C)   SpO2: 94%

## 2019-03-28 NOTE — PROGRESS NOTES
CLINICAL PHARMACY NOTE: MEDS TO [address] Select Patient?: No  Total # of Prescriptions Filled: 1   The following medications were delivered to the patient:  · On file  Total # of Interventions Completed: 0  Time Spent (min): 15    Additional Documentation:

## 2019-03-28 NOTE — PROGRESS NOTES
Department of Orthopedic Surgery     Progress Note    Subjective:   Admit Day:3/26/2019    Patient is comfortable appearing. Denies chest pain, shortness of air or calf pain. Tolerating PO. Objective[de-identified]    height is 6' (1.829 m) and weight is 211 lb (95.7 kg). His oral temperature is 98.8 °F (37.1 °C). His blood pressure is 96/59 (abnormal) and his pulse is 101. His respiration is 16 and oxygen saturation is 95%. General:  No acute distress. Operative Incision:  Dressing is clean, dry and intact. Operative Extremity:  - Motor function intact to Tibialis Anterior, Gastroc-Soleus Complex, Extensor Hallucis Longus, and Flexor Hallucis Longus.  -  Sensation intact to light touch in sural, saphenous, superficial peroneal, deep peroneal and tibial nerve distributions. -  Toes are wwp with a palpable dorsalis pedis pulse. Negative Tiffany's Sign Bilaterally    Labs:  Lab Results   Component Value Date    WBC 9.3 03/21/2019    HGB 11.8 03/27/2019    HCT 35.1 03/27/2019     03/21/2019       Lab Results   Component Value Date    INR 0.98 03/21/2019       Lab Results   Component Value Date     03/21/2019    K 5.0 03/21/2019    CO2 24 03/21/2019    BUN 30 03/21/2019    CREATININE 1.0 03/21/2019    CALCIUM 9.6 03/21/2019       Assessment:   Principal Problem:    Arthritis of left knee  Resolved Problems:    * No resolved hospital problems. *      POD # 2 s/p Left TKA. Doing well. Resolution of urinary retention. Patient able to get out of bed and ambulate to bathroom this morning. Reports getting a good nights sleep and having less pain today. Will monitor mobility progress with physical therapy today and plan for dc home with outpatient PT. Plan:   1. Weightbearing as tolerated   2. DVT Prophylaxis:  Plavix  3. PT/OT per protocol - outpatient  4. Pain control: per protocol    Disposition:  Home pending mobility/progress with therapy and medical stability.

## 2019-03-28 NOTE — CARE COORDINATION
Case Management Daily Note:    Current Plan of Care: Continue PT eval/treat 2 x daily. Plan to remain inpatient until 3/29       PT AM-PAC: 17/24    Date: 3/28    OT AM-PAC: 18/24   Date: 3/27    DME needs: raised toilet seat with arms, per OT       Discharge Plan: DC Friday 3/29 after second round of therapy. Tentative Discharge Date: 3/29    Current Barriers to Discharge: Not medically ready    Resources/Information given: Contact information. Case Management Notes: Patient was not medically ready today after therapy. Dr. Dionicio Albarado team elected to keep the patient another day with goal of discharge home on Friday evening.          James Zacarias, MSW, LSW     [facility]   [phone]

## 2019-03-28 NOTE — PROGRESS NOTES
Occupational Therapy      Chart reviewed. Pt declined out of bed therapy this session secondary to having just finished with PT. Encouraged pt for participation and educated on LE dressing techniques. Wife has back pain and is concerned about independent LE dressing. Pt would benefit from trial of AE sock aid and reacher but declined use this session. Educated on safe home set up for removal of throw rugs and installation of raised toilet seat with rails. Wife and Pt verbalized understanding. Continue OT per POC.      [address] [place]/L

## 2019-03-28 NOTE — PROGRESS NOTES
Physical Therapy  Facility/Department: [facility] 5T ORTHO/NEURO  Daily Treatment Note  NAME: Reid Mcneal  : 1940  MRN: [id number]    Date of Service: 3/28/2019    Discharge Recommendations:    Reid Mcneal scored a 17/24 on the AM-PAC short mobility form. Current research shows that an AM-PAC score of 17 or less is typically not associated with a discharge to the patient's home setting. Based on the patients AM-PAC score and their current functional mobility deficits, it is recommended that the patient have 3-5 sessions per week of Physical Therapy at d/c to increase the patients independence. Assessment: Pt more towards functional baseline this afternoon as his cognition has improved. CGA/Min assist required for ambulation with cueing throughout, however he was able to sequence steps more on his own. Min A for transfers but overall more stable than before. Pt did require moderate assist and education with many of ROM activities as he is generally weak. Pt not safe to ambulate alone or be home alone at this time and will continue to benefit from skilled IP PT. Continue to follow. PT Equipment Recommendations  Equipment Needed: No    Patient Diagnosis(es): The encounter diagnosis was Arthritis of left knee. has a past medical history of Arthritis, CAD (coronary artery disease), Diabetes mellitus ([address] Hearing loss, Hyperlipidemia, Hypertension, and Kidney stone. has a past surgical history that includes Pacemaker insertion (2017); Coronary artery bypass graft (); joint replacement (Right, ); Carpal tunnel release (Bilateral); parathyroidectomy; Cardiac surgery; and Total knee arthroplasty (Left, 3/26/2019). Restrictions  Position Activity Restriction  Other position/activity restrictions: Full WBAT; per total joint protocol   Subjective   General  Chart Reviewed: Yes  Additional Pertinent Hx: Pt is a 69 y.o. male admitted to [facility] s/p L TKA. XR knee - neg fx.  Pertinent PMH: Arthritis, CAD, DM, Hyperlipidemia, HTN, Cardiac Sx (6 stents), Carpal tunnel release (B), Coronary Artery bypass graft (2005), R TKA (2010), Pacemaker insertion (2017), Parathyroidectomy. Response To Previous Treatment: Patient with no complaints from previous session. Family / Caregiver Present: Yes(wife)  Referring Practitioner: Mitchel Jacobs MD  Subjective  Subjective: pt alert in bed upon arrival. Pt expresses that he is \"tired and in more pain\" but agreeable to PT and to \"get better\". Pt educated on why he is feeling the way he is. Pain Screening  Patient Currently in Pain: Yes(Calf pain on the left; states it is \"similar to phantom pain\". No swelling noted. )  Vital Signs  Patient Currently in Pain: Yes(Calf pain on the left; states it is \"similar to phantom pain\". No swelling noted. RN aware )       Orientation  Orientation  Overall Orientation Status: Within Functional Limits  Orientation Level: Oriented X4  Cognition   Cognition  Following Commands: Follows one step commands with increased time; Follows one step commands with repetition  Attention Span: Attends with cues to redirect  Memory: Appears intact  Safety Judgement: Decreased awareness of need for assistance;Decreased awareness of need for safety  Problem Solving: Assistance required to correct errors made;Assistance required to identify errors made  Insights: Fully aware of deficits  Initiation: Requires cues for some  Sequencing: Requires cues for all    Objective   Bed mobility  Supine to Sit: Stand by assistance(effortful and required increased time to complete)  Sit to Supine: Stand by assistance(effortful and required increased time to complete)  Scooting: Stand by assistance(in Trendelenburg position; pt only able to use LUE)  Transfers  Sit to Stand: Minimal Assistance(effortful and required increased time but less assist required)  Stand to sit: Minimal Assistance(effortful and required increased time but less assist required)  Ambulation  Ambulation?: Yes  Ambulation 1  Surface: level tile  Device: Rolling Walker  Assistance: Contact guard assistance;Minimal assistance(pt required minimal verbal cues for safety and technique. Pt able to give self cueing for technique with minor errors that needed to be noted. )  Quality of Gait: slowed, antalgic gait; cues for safety and technique; decreased stance time on LLE; forward flexed posture; decreased WB on LLE; step to -> step through gait pattern; Maximal cues to use RW correctly  Distance: 90'x1 (EOB to/from hallway)  Comments: Required less cueing during ambulation but still unsteady throughout. Balance  Posture: Good  Sitting - Static: Good(supervision)  Sitting - Dynamic: Good(supervision)  Standing - Static: Good(SBA)  Standing - Dynamic: Good;-(CGA; use of urinal)  Exercises  Straight Leg Raise: x10 LLE in supine with mod assist  Quad Sets: x10 LLE in supine with min cueing but otherwise independent  Heelslides: x10 LLE in sitting with mod assist  Gluteal Sets: x10 B in sitting; independent  Knee Long Arc Quad: x10 LLE in sitting with mod assist  Ankle Pumps: x10 BLE in sitting with min cueing                       Assessment   Body structures, Functions, Activity limitations: Decreased functional mobility ; Decreased ADL status; Decreased ROM; Decreased strength;Decreased safe awareness; Increased Pain;Decreased balance;Decreased endurance  Assessment: Pt more towards functional baseline this afternoon as his cognition has improved. CGA/Min assist required for ambulation with cueing throughout, however he was able to sequence steps more on his own. Min A for transfers but overall more stable than before. Pt did require moderate assist and education with many of ROM activities as he is generally weak. Pt not safe to ambulate alone or be home alone at this time and will continue to benefit from skilled IP PT. Continue to follow.   Treatment Diagnosis: impaired functional mobility

## 2019-03-28 NOTE — DISCHARGE SUMMARY
[facility] ORTHOPAEDICS DISCHARGE SUMMARY    Pre Operative Diagnosis  UNILATERAL PRIMARY OSTEOARTHRITIS LEFT KNEE    Post Operative Diagnosis  same    Procedure Preformed  Procedure(s):  LEFT TOTAL KNEE ARTHROPLASTY    Surgeon  Abdoulaye Castillo MD     Medical course: as per medical records       The patient was taken to the operating room  where the aforementioned procedure was preformed. The patient was taken to the post operative anesthesia recovery unit in stable condition. The patient was then transferred to the orthopaedic floor for post operative pain management and convalesce. ( x )The patient was placed on anticoagulation therapy for DVT prophylaxis       The patient was discharged on 3/29/2019 in stable condition. Please see medical reconciliation for discharge medications. The discharge instructions were explained to the patient and the family. The patient will follow up in the office in 3 weeks for repeat examination and xray .

## 2019-03-28 NOTE — PROGRESS NOTES
Physical Therapy  Facility/Department: [facility] 5T ORTHO/NEURO  Daily Treatment Note  NAME: Vangie Heart  : 1940  MRN: [id number]    Date of Service: 3/28/2019    Discharge Recommendations:    Vangie Heart scored a 16/24 on the AM-PAC short mobility form. Current research shows that an AM-PAC score of 17 or less is typically not associated with a discharge to the patient's home setting. Based on the patients AM-PAC score and their current functional mobility deficits, it is recommended that the patient have 3-5 sessions per week of Physical Therapy at d/c to increase the patients independence. Assessment: Pt below functional baseline and slightly worsened cognitively than previous sessions. CGA/Min assist required for ambulation. Pt required maximal cueing for sequencing of all mobility and moderate assistance for transfers and stair negotiation. Pt not safe to ambulate alone nor is he same to be home alone. Pt's wife expresses concerns regarding safety at home as she is unable to care for him herself at this time. Pt to continue to benefit from skilled IP PT at this time for safety. PT Equipment Recommendations  Equipment Needed: No    Patient Diagnosis(es): The encounter diagnosis was Arthritis of left knee. has a past medical history of Arthritis, CAD (coronary artery disease), Diabetes mellitus ([address] Hearing loss, Hyperlipidemia, Hypertension, and Kidney stone. has a past surgical history that includes Pacemaker insertion (2017); Coronary artery bypass graft (); joint replacement (Right, ); Carpal tunnel release (Bilateral); parathyroidectomy; Cardiac surgery; and Total knee arthroplasty (Left, 3/26/2019). Restrictions  Position Activity Restriction  Other position/activity restrictions: Full WBAT  Subjective   General  Chart Reviewed: Yes  Additional Pertinent Hx: Pt is a 69 y.o. male admitted to [facility] s/p L TKA. XR knee - neg fx.  Pertinent PMH: Arthritis, CAD, DM, Hyperlipidemia, HTN, Cardiac Sx (6 stents), Carpal tunnel release (B), Coronary Artery bypass graft (2005), R TKA (2010), Pacemaker insertion (2017), Parathyroidectomy. Response To Previous Treatment: Patient with no complaints from previous session. Family / Caregiver Present: Yes(Wife)  Referring Practitioner: Enzo Guzman MD  Subjective  Subjective: Pt in chair upon arrival. RN with pt to use urinal. Pt agreeable to PT. Pain Screening  Patient Currently in Pain: Yes(With movement; \"pin point to top of knee\"; RN aware)  Vital Signs  Patient Currently in Pain: Yes(With movement; \"pin point to top of knee\"; RN aware)       Orientation  Orientation  Overall Orientation Status: Within Functional Limits  Orientation Level: Oriented X4  Cognition   Cognition  Cognition Comment: Pt's cognitive status more impaired than yesterday. He required cueing for all initiation and sequencing of tasks as well as to correct errors he made. Pt had difficulty with following one step commands and to attend to cueing for redirection. Objective      Transfers  Sit to Stand: Moderate Assistance(Effortful; required maximal cueing for safety and sequencing; increased time to complete)  Stand to sit: Moderate Assistance(Effortful; required maximal cueing for safety and sequencing; increased time to complete; decreased control with lowering)  Ambulation 1  Device: Rolling Walker  Assistance: Contact guard assistance;Minimal assistance(Consistently throughout with cueing)  Quality of Gait: slowed, antalgic gait; cues for safety and technique; decreased stance time on LLE; forward flexed posture; decreased WB on LLE; step to gait pattern; Maximal cues to use RW correctly  Distance: 16'x2 (chair to/from W/C); 10'x1 (stairs to w/c)  Comments: Pt fatigued in the UEs with ambulation; required maximal cueing for safety and technique.  Pt did not demonstrate much carryover  Stairs/Curb  Stairs?: Yes  Stairs  Rails: Left ascending  Device: safety awareness, d/c planning. Pt and spouse verbalize understanding - will require reinforcement. REQUIRES PT FOLLOW UP: Yes     G-Code     OutComes Score                                                  AM-PAC Score  AM-PAC Inpatient Mobility Raw Score : 16  AM-PAC Inpatient T-Scale Score : 40.78  Mobility Inpatient CMS 0-100% Score: 54.16  Mobility Inpatient CMS G-Code Modifier : CK          Goals  Short term goals  Time Frame for Short term goals: By D/C  Short term goal 1: sit <> stand SBA;ongoing  Short term goal 2: ambulate 27' with LRAD SBA; partially met (3/27) ambulate 100' with LRAD SBA  Short term goal 3: negotiate 3 steps with LRAD SBA; ongoing    Plan    Plan  Times per week: 7  Times per day: Twice a day  Current Treatment Recommendations: Strengthening, ROM, Gait Training, Stair training, Endurance Training, Transfer Training, Functional Mobility Training, Balance Training, Patient/Caregiver Education & Training, Safety Education & Training  Safety Devices  Type of devices: Left in chair, Nurse notified, Chair alarm in place, Call light within reach, Gait belt, Patient at risk for falls(All needs met and within reach)     Therapy Time   Individual Concurrent Group Co-treatment   Time In 1044         Time Out 1150         Minutes 66           Timed Code Treatment Minutes: 66      Total Treatment Minutes:  66      If pt d/c'd prior to next treatment, this note serves as a discharge note. Germain Fabry, Student Physical Therapist    Therapist was present, directed the patient's care, made skilled judgement, and was responsible for assessment and treatment of the patient.   Rose Sams, [address] [place] [address]

## 2019-03-29 VITALS
DIASTOLIC BLOOD PRESSURE: 57 MMHG | WEIGHT: 211 LBS | TEMPERATURE: 98.8 F | HEIGHT: 72 IN | SYSTOLIC BLOOD PRESSURE: 97 MMHG | OXYGEN SATURATION: 91 % | RESPIRATION RATE: 17 BRPM | BODY MASS INDEX: 28.58 KG/M2 | HEART RATE: 86 BPM

## 2019-03-29 LAB
GLUCOSE BLD-MCNC: 115 MG/DL (ref 70–99)
GLUCOSE BLD-MCNC: 236 MG/DL (ref 70–99)
PERFORMED ON: ABNORMAL
PERFORMED ON: ABNORMAL

## 2019-03-29 PROCEDURE — 97110 THERAPEUTIC EXERCISES: CPT

## 2019-03-29 PROCEDURE — 6370000000 HC RX 637 (ALT 250 FOR IP): Performed by: FAMILY MEDICINE

## 2019-03-29 PROCEDURE — 97116 GAIT TRAINING THERAPY: CPT

## 2019-03-29 PROCEDURE — 6370000000 HC RX 637 (ALT 250 FOR IP): Performed by: PHYSICIAN ASSISTANT

## 2019-03-29 PROCEDURE — 2580000003 HC RX 258: Performed by: ORTHOPAEDIC SURGERY

## 2019-03-29 PROCEDURE — 97530 THERAPEUTIC ACTIVITIES: CPT

## 2019-03-29 PROCEDURE — 6370000000 HC RX 637 (ALT 250 FOR IP): Performed by: ORTHOPAEDIC SURGERY

## 2019-03-29 PROCEDURE — 97535 SELF CARE MNGMENT TRAINING: CPT

## 2019-03-29 RX ADMIN — METFORMIN HYDROCHLORIDE 1000 MG: 1000 TABLET, FILM COATED ORAL at 07:45

## 2019-03-29 RX ADMIN — SERTRALINE HYDROCHLORIDE 100 MG: 100 TABLET ORAL at 07:45

## 2019-03-29 RX ADMIN — DOCUSATE SODIUM 100 MG: 100 CAPSULE, LIQUID FILLED ORAL at 07:45

## 2019-03-29 RX ADMIN — METHOCARBAMOL TABLETS 750 MG: 750 TABLET, COATED ORAL at 07:46

## 2019-03-29 RX ADMIN — OXYCODONE HYDROCHLORIDE 10 MG: 5 TABLET ORAL at 08:44

## 2019-03-29 RX ADMIN — TAMSULOSIN HYDROCHLORIDE 0.8 MG: 0.4 CAPSULE ORAL at 07:45

## 2019-03-29 RX ADMIN — OXYCODONE HYDROCHLORIDE 10 MG: 5 TABLET ORAL at 14:17

## 2019-03-29 RX ADMIN — ACETAMINOPHEN 1000 MG: 500 TABLET, FILM COATED ORAL at 07:46

## 2019-03-29 RX ADMIN — OXYCODONE HYDROCHLORIDE 10 MG: 5 TABLET ORAL at 00:53

## 2019-03-29 RX ADMIN — CLOPIDOGREL BISULFATE 75 MG: 75 TABLET ORAL at 07:45

## 2019-03-29 RX ADMIN — ASPIRIN 81 MG: 81 TABLET, COATED ORAL at 07:45

## 2019-03-29 RX ADMIN — Medication 10 ML: at 07:48

## 2019-03-29 RX ADMIN — GLIPIZIDE 10 MG: 5 TABLET ORAL at 07:45

## 2019-03-29 ASSESSMENT — PAIN SCALES - GENERAL
PAINLEVEL_OUTOF10: 7
PAINLEVEL_OUTOF10: 2
PAINLEVEL_OUTOF10: 7
PAINLEVEL_OUTOF10: 0
PAINLEVEL_OUTOF10: 7
PAINLEVEL_OUTOF10: 3

## 2019-03-29 ASSESSMENT — PAIN DESCRIPTION - LOCATION
LOCATION: KNEE

## 2019-03-29 ASSESSMENT — PAIN DESCRIPTION - DESCRIPTORS
DESCRIPTORS: ACHING
DESCRIPTORS: ACHING;DISCOMFORT
DESCRIPTORS: ACHING

## 2019-03-29 ASSESSMENT — PAIN DESCRIPTION - FREQUENCY
FREQUENCY: CONTINUOUS

## 2019-03-29 ASSESSMENT — PAIN DESCRIPTION - PROGRESSION
CLINICAL_PROGRESSION: NOT CHANGED

## 2019-03-29 ASSESSMENT — PAIN DESCRIPTION - PAIN TYPE
TYPE: SURGICAL PAIN

## 2019-03-29 ASSESSMENT — PAIN DESCRIPTION - ORIENTATION
ORIENTATION: LEFT

## 2019-03-29 ASSESSMENT — PAIN DESCRIPTION - ONSET
ONSET: ON-GOING

## 2019-03-29 NOTE — PLAN OF CARE
Problem: Falls - Risk of:  Goal: Will remain free from falls  Description  Will remain free from falls   Outcome: Ongoing   Patient remains free from falls during this shift. Patient is up x1 person assist with walker. Bed is in the lowest position and the bed and chair alarm is activated. Anti-slip socks are on. Call light is within reach. Will continue to monitor and reassess. Problem: Pain:  Goal: Pain level will decrease  Description  Pain level will decrease   Outcome: Ongoing   RN assesses pain using 0-10 scale. Patient understands how to rate pain using 0-10 scale. Pain is controled with medication per MAR. RN encourages patient to call out for breakthrough pain. Will continue to monitor and reassess.

## 2019-03-29 NOTE — CARE COORDINATION
[facility] Ortho Bundle Transition Interview    Patient Interviewed:  Jean Muniz  Informed patient of [facility] Ortho Bundle Program and role of CTS/CTC. CMS Letter Given:  Yes     Readmission Risk  Patient Active Problem List   Diagnosis    Arthritis of left knee       Inpatient Assessment  Care Transitions Summary    Care Transitions Inpatient Review  Medication Review  Housing Review  Social Support  Durable Medical Equipment  Functional Review  Hearing and Vision  Care Transitions Interventions       SUMMARY:  [facility] saw Pt today and explained [facility] Bundle program and provided an additional copy of the CMS notification letter. [facility] advised Pt they will receive a f/u call the day after d/c and calls over next 90 days. Pt denies and concerns or issues at this time and is agreeable with f/u calls from [facility]. Pt plans to begin OP therapy on Monday, April 1. Anticipated Needs Post Evaluation:    Follow up appointments:    No future appointments. JANNA Ho, RN  Care Transition Coordinator  Contact Number:  [phone]

## 2019-03-29 NOTE — PROGRESS NOTES
Occupational Therapy  Facility/Department: [facility] 5T ORTHO/NEURO  Daily Treatment Note  NAME: Helen Lee  : 1940  MRN: [id number]    Date of Service: 3/29/2019    Discharge Recommendations:   Helen Lee scored a 20/24 on the AM-PAC ADL Inpatient form. Current research shows that an AM-PAC score of 18 or greater is typically associated with a discharge to the patient's home setting. Based on the patients AM-PAC score and their current ADL deficits, it is recommended that the patient have 2-3 sessions per week of Occupational Therapy at d/c to increase the patients independence. OT Equipment Recommendations  ADL Assistive Devices: Toileting - Raised Toilet Seat with arms    Assessment   Performance deficits / Impairments: Decreased functional mobility ; Decreased ADL status; Decreased endurance  Assessment: Pt demonstrating functional mobility to and from bathroom with CGA and RW requiring extra time for sequencing correct step RW use. Toilet transfer completed with CGA and VCs for safety. Continue OT per POC. Treatment Diagnosis: Impaired ADL/functional mobiity  Patient Education: Safe home set up, ice application, precautions, AE for LE dressing  REQUIRES OT FOLLOW UP: Yes  Activity Tolerance  Activity Tolerance: Patient Tolerated treatment well;Patient limited by pain  Safety Devices  Safety Devices in place: Yes  Type of devices: Left in chair;Nurse notified; Chair alarm in place;Call light within reach;Gait belt         Restrictions  Position Activity Restriction  Other position/activity restrictions: Full WBAT; per total joint protocol   Subjective   General  Chart Reviewed: Yes  Patient assessed for rehabilitation services?: Yes  Additional Pertinent Hx: 3/26/19 Lft TKR     PMH includes: arthritis, CAD, DM, HTN, cardiac stents, bilateral CTR, CABG, Rt TKR, pacemaker  Response to previous treatment: Patient with no complaints from previous session  Family / Caregiver Present: No  Referring goal Met 3/29  Short term goal 3: Stance with SBA x5 min while engaging in ADL/functinal mobility - ongoing  Patient Goals   Patient goals : Go home. Return to work.        Therapy Time   Individual Concurrent Group Co-treatment   Time In 3080         Time Out 1150         Minutes 38         Timed Code Treatment Minutes: 38 Minutes     Total treatment Minutes: [address]/L

## 2019-03-29 NOTE — PROGRESS NOTES
Physical Therapy  Facility/Department: [facility] 5T ORTHO/NEURO  Daily Treatment Note  NAME: Dani Adams  : 1940  MRN: [id number]    Date of Service: 3/29/2019    Discharge Recommendations:    Dani Adams scored a 18/24 on the AM-PAC short mobility form. Current research shows that an AM-PAC score of 18 or greater is typically associated with a discharge to the patient's home setting. Based on the patients AM-PAC score and their current functional mobility deficits, it is recommended that the patient have 2-3 sessions per week of Physical Therapy at d/c to increase the patients independence. Assessment: Pt continues to progress towards being safe to return home this date. With improvements with his cognition, CGA required for ambulation and transfers with cueing throughout. While pt able to sequence steps more independently, pt moves through activities slowly and deliberately. Pt nearing but not quite safe to return home alone as min assist required intermittently with stair negotiation. Continue to follow. PT Equipment Recommendations  Equipment Needed: No    Patient Diagnosis(es): The encounter diagnosis was Arthritis of left knee. has a past medical history of Arthritis, CAD (coronary artery disease), Diabetes mellitus ([facility] [address] Hearing loss, Hyperlipidemia, Hypertension, and Kidney stone. has a past surgical history that includes Pacemaker insertion (2017); Coronary artery bypass graft (); joint replacement (Right, ); Carpal tunnel release (Bilateral); parathyroidectomy; Cardiac surgery; and Total knee arthroplasty (Left, 3/26/2019). Restrictions  Position Activity Restriction  Other position/activity restrictions: Full WBAT; per total joint protocol   Subjective   General  Chart Reviewed: Yes  Additional Pertinent Hx: Pt is a 69 y.o. male admitted to [facility] s/p L TKA. XR knee - neg fx.  Pertinent PMH: Arthritis, CAD, DM, Hyperlipidemia, HTN, Cardiac Sx (6 stents), Carpal tunnel release (B), Coronary Artery bypass graft (2005), R TKA (2010), Pacemaker insertion (2017), Parathyroidectomy. Response To Previous Treatment: Patient with no complaints from previous session. Family / Caregiver Present: No  Referring Practitioner: Yamel West MD  Subjective  Subjective: Pt in chair upon arrival. Pt eager expresses interest in treatment and getting up stating, \"If I do well, will I be able to go home? \"  Pain Screening  Patient Currently in Pain: Yes(5/10; Incision site; RN aware)         Orientation  Orientation  Overall Orientation Status: Within Functional Limits  Orientation Level: Oriented X4  Cognition   Cognition  Overall Cognitive Status: [facility]  Cognition Comment: Pt's cognition has improved since previous sessions. He continues to require cueing for sequencing tasks but overall, pt able to problem solve on his own slowly. Objective   Bed mobility  Scooting: Stand by assistance(into back of chair. One min assist back into chair when pt scooted too far forward)  Comment: Bed mobility not performed this date  Transfers  Sit to Stand: Contact guard assistance(Effortful; x1 trial from chair)  Stand to sit: Contact guard assistance(Effortful; x1 trial from chair)  Ambulation  Ambulation?: Yes  Ambulation 1  Surface: level tile  Device: Rolling Walker  Assistance: Contact guard assistance  Quality of Gait: slowed, antalgic gait; cues for safety and technique; decreased stance time on LLE; forward flexed posture; decreased WB on LLE; step through gait pattern; Minimal cues for use of RW  Distance: 50'x2 (EOB to stairwell; stairwell to chair)  Stairs/Curb  Stairs?: Yes(2 steps up/down x1 trial; 3 steps up/down x1 trial)  Stairs  Rails: Left ascending  Device: Single pt cane  Assistance: Minimal assistance  Comment: Pt required moderate cueing for technique, sequencing, and safety. Pt fearful to move LLE up/down steps and required min assist. Carryover demonstrated.      Balance  Posture: Good  Sitting - Static: Good(supervision)  Sitting - Dynamic: Good(supervision)  Standing - Static: Good(SBA)  Exercises  Straight Leg Raise: x10 LLE in sitting with mod assist  Quad Sets: x10 LLE in sitting independent  Heelslides: x10 LLE in sitting with mod assist  Gluteal Sets: x10 B in sitting; independent  Hip Abduction: x10 LLE in sitting with supervision  Knee Long Arc Quad: x10 LLE in sitting with mod assist  Ankle Pumps: x10 BLE in sitting with min cueing   AROM LLE (degrees)  LLE General AROM: Ankle DF WFL; 83* Knee flexion; -2* from neutral Knee extension; during heelslide and quad set exercises in sitting                    Assessment   Assessment: Pt continues to progress towards being safe to return home this date. With improvements with his cognition, CGA required for ambulation and transfers with cueing throughout. While pt able to sequence steps more independently, pt moves through activities slowly and deliberately. Pt nearing but not quite safe to return home alone as min assist required intermittently with stair negotiation. Continue to follow. Treatment Diagnosis: impaired functional mobility 2/2 increased pain and decreased balance. Patient Education: role of PT, ther ex activities, use of AD, importance of OOB and movement, safety awareness, d/c planning. Pt and spouse verbalize understanding - will require reinforcement.   REQUIRES PT FOLLOW UP: Yes     G-Code     OutComes Score                                                  AM-PAC Score  AM-PAC Inpatient Mobility Raw Score : 18  AM-PAC Inpatient T-Scale Score : 43.63  Mobility Inpatient CMS 0-100% Score: 46.58  Mobility Inpatient CMS G-Code Modifier : CK          Goals  Short term goals  Time Frame for Short term goals: By D/C  Short term goal 1: sit <> stand SBA;ongoing  Short term goal 2: ambulate 27' with LRAD SBA; partially met (3/27) ambulate 100' with LRAD SBA  Short term goal 3: negotiate 3 steps with LRAD SBA; ongoing  Short term goal 4: Independent with HEP    Plan    Plan  Times per week: 7  Times per day: Twice a day  Current Treatment Recommendations: Strengthening, ROM, Gait Training, Stair training, Endurance Training, Transfer Training, Functional Mobility Training, Balance Training, Patient/Caregiver Education & Training, Safety Education & Training  Safety Devices  Type of devices: Call light within reach, Chair alarm in place, Gait belt, Left in chair, Nurse notified(all needs within reach.)     Therapy Time   Individual Concurrent Group Co-treatment   Time In 0934         Time Out 1022         Minutes 48           Timed Code Treatment Minutes: 48      Total Treatment Minutes:  48      If pt d/c'd prior to next treatment, this note serves as a discharge note. Keyon Grubbs, Student Physical Therapist     Therapist was present, directed the patient's care, made skilled judgement, and was responsible for assessment and treatment of the patient.   Daniel Salazar, [address] [place] [address]

## 2019-03-29 NOTE — PROGRESS NOTES
Patient with no complaints from previous session. Family / Caregiver Present: Yes(Wife and friend present at end of treatment)  Referring Practitioner: Ava Marcus MD  Subjective  Subjective: Pt walking with PCA upon arrival. Pt agreeable to continuing ambulating and with PT treatment. Pain Screening  Patient Currently in Pain: Yes(not rated; \"It's not as bad. ..only when I move\")         Orientation  Orientation  Overall Orientation Status: Within Functional Limits  Orientation Level: Oriented X4  Cognition   Cognition  Overall Cognitive Status: WFL  Cognition Comment: Pt's cognition has improved since previous sessions. He continues to require cueing for sequencing tasks but overall, pt able to problem solve on his own slowly. Objective   Bed mobility  Sit to Supine: Stand by assistance(Effortful; use of RLE to assist with LLE)  Scooting: Stand by assistance (to [facility])  Transfers  Stand to sit: Stand by assistance(x1 trial to EOB; decreased cueing required)  Ambulation  Ambulation?: Yes  Ambulation 1  Surface: level tile  Device: Rolling Walker  Assistance: Contact guard assistance progressing to SBA  Quality of Gait: slowed gait, less antalgic; minimal cues for technique. decreased stance time on LLE; forward flexed posture; step through gait pattern. Distance: 120'x1 (hallway to EOB)  Comments: More steady throughout   Pt declined to practice steps. Reviewed technique with pt and wife.   Both verbalized understanding  Balance  Posture: Good  Sitting - Static: Good(supervision)  Sitting - Dynamic: Good(supervision)  Standing - Static: Good(Supervision)  Exercises  Straight Leg Raise: x10 LLE in supine with min assist  Quad Sets: x10 LLE in supine; independent with cueing  Heelslides: x10 LLE in sitting with mod assist  Gluteal Sets: x10 B in sitting; independent  Hip Abduction: x10 LLE in supine with min assist  Knee Long Arc Quad: x10 LLE in sitting with min assist  Ankle Pumps: x10 BLE in sitting with no cueing; Independent   AROM LLE (degrees)  LLE General AROM: Not formally tested, however during heelslide exercises pt appears to have reach approx 90* in sitting. This was following \"I want to push myself passed 90*\" despite encouragement to not do push too hard. Assessment   Body Structures, Functions, Activity limitations: Decreased functional mobility, Decreased ROM/strength, Decreased safety awareness, decreased balance. Assessment: Pt continuing to progress towards safe ambulation and increased independence. SBA during ambulation and transfers, however pt requires increased time to complete. Pt more safe at this time but would benefit from 24 hr assist initially at home. Treatment Diagnosis: impaired functional mobility 2/2 increased pain and decreased balance. Patient Education: role of PT, ther ex activities, use of AD, importance of OOB and movement, safety awareness, d/c planning. Pt's wife educated on ther ex, safety with ambulation and safety with stair negotiation. Pt and spouse verbalize understanding. REQUIRES PT FOLLOW UP: Yes     G-Code     OutComes Score                                                  AM-PAC Score  AM-PAC Inpatient Mobility Raw Score : 18  AM-PAC Inpatient T-Scale Score : 43.63  Mobility Inpatient CMS 0-100% Score: 46.58  Mobility Inpatient CMS G-Code Modifier : CK          Goals  Short term goals  Time Frame for Short term goals: By D/C  Short term goal 1: sit <> stand SBA;met 3/29  Short term goal 2: ambulate 27' with LRAD SBA; partially met (3/27) ambulate 100' with LRAD SBA; Met 3/29  Short term goal 3: negotiate 3 steps with LRAD SBA; ongoing  Short term goal 4:  Independent with HEP; ongoing    Plan    Plan  Times per week: 7  Times per day: Twice a day  Current Treatment Recommendations: Strengthening, ROM, Gait Training, Stair training, Endurance Training, Transfer Training, Functional Mobility Training, Balance Training, Patient/Caregiver

## 2019-03-29 NOTE — FLOWSHEET NOTE
03/29/19 1251   Encounter Summary   Services provided to: Patient   Volunteer Visit   (3/89 keith)   Routine   Intervention Ritual  (shabbat bag)

## 2019-03-29 NOTE — CARE COORDINATION
CTC attempted to meet with the Pt to complete the Inpatient Interview. Pt is working with therapy and CTC will attempt to see the Pt at a later time. NKECHI ChristianN, RN  Care Transition Coordinator  Contact Number:  [phone]

## 2019-03-29 NOTE — CARE COORDINATION
3/29/2019  [facility])  Clinical Case Management Department    Discharge Summary    Patient: Shaina West  MRN: [id number] / : 1940         Admission Documentation  Attending Provider: No att. providers found  Admit date/time: 3/26/2019  8:13 AM  Status: Inpatient [101]  Diagnosis: Arthritis of left knee     Readmission within last 30 days:  no     Living Situation:  Discharge Planning  Living Arrangements: Spouse/Significant Other, Children  Support Systems: Spouse/Significant Other  Potential Assistance Needed: N/A  Type of Home Care Services: None  Patient expects to be discharged to[de-identified] home with wife shira  Expected Discharge Date: 19    Written Discharge Summary:  Patient to discharge home today. No needs at discharge. Spouse to provide transport home. IMM completed. No other needs at this time reported by family.      Service Assessment :       Values / Beliefs  Do you have any ethnic, cultural, sacramental, or spiritual Orthodoxy needs you would like us to be aware of while you are in the hospital?: No    Advance Directives (For Healthcare)  Pre-existing DNR Comfort Care/DNR Arrest/DNI Order: No  Healthcare Directive: Yes, patient has an advance directive for healthcare treatment  Type of Healthcare Directive: Durable power of  for health care, Living will  Copy in Chart: Other (Comment)  If you are unable to speak for yourself, does your Healthcare Agent or Legal Spokesperson know your healthcare wishes?: Yes                        Pharmacy: No concerns notd    Potential Assistance Purchasing Medications:  No  Does patient want to participate in local refill/meds to beds program?: Not Assessed    Notification for [facility] placement completed in [facility]/PAS?: No     Discharge disposition: Home  Discharge Event  Discharged with Documented Belongings: Yes  Departure Mode: With spouse  Mobility at Departure: Ambulatory  Discharged to: Private Residence    Ancillary: Gladys Carcamo and his family were provided with choice of provider; he and his family are in agreement with the discharge plan.       Care Transitions patient: SEVERIANO Resendiz  [facility]  Case Management Department  Ph: [phone]

## 2019-03-29 NOTE — PLAN OF CARE
Problem: Pain:  Goal: Pain level will decrease  Description  Pain level will decrease   3/29/2019 1117 by Mariana Gore RN  Outcome: Ongoing  3/29/2019 0410 by Fly Cuevas RN  Outcome: Ongoing   Pt. States pain is around a 3/10 to left knee. Pt. Does state his pain can rise to a 9/10. Pt. Only requiring PRN oxy and scheduled Robaxin and Tylenol for pain relief. Pt states this is effective for him. Problem: Mobility - Impaired:  Goal: Mobility will improve  Description  Mobility will improve   Outcome: Ongoing   Pt. Ambulates well with rolling walker and gait belt. Pt. Needs frequent safety cues with transfers. Pt. Did however do better today compared to yesterday. PT/OT to work with pt. After lunch again to re-evaluate.

## 2019-03-30 ENCOUNTER — CARE COORDINATION (OUTPATIENT)
Dept: CASE MANAGEMENT | Age: 79
End: 2019-03-30

## 2019-03-30 DIAGNOSIS — Z96.652 STATUS POST TOTAL LEFT KNEE REPLACEMENT: Primary | ICD-10-CM

## 2019-03-30 PROCEDURE — 1111F DSCHRG MED/CURRENT MED MERGE: CPT | Performed by: ORTHOPAEDIC SURGERY

## 2019-04-09 ENCOUNTER — CARE COORDINATION (OUTPATIENT)
Dept: CASE MANAGEMENT | Age: 79
End: 2019-04-09

## 2019-04-09 NOTE — CARE COORDINATION
Patient: Susan Silva   Patient : 1940  MRN: <[id number]>    Reason for Admission:  3/26/2019  PROCEDURE(S) PERFORMED: Right total knee arthroplasty    SURGEON: Abbey Ospina M.D.   [facility] with patient's spouse    Incision status: healing well, has removed surgical dressing and applied new dressing. States no bleeding or drainage noted at this point. Is to leave on until he sees surgeon on Monday. Edema/Swelling: very minimal    Pain level and status: severe pain to knee, uses ice as well as pain medications prescribed, is walking as tolerated    Use of pain medications: takes 4 a day,     Bowels:  Over-the-Counter Senokot (or Over-the-Counter Colace  (Docusate Sodium) 1-2 tabs by mouth twice daily, continue while on narcotics, hold for loose stools. Home Care Agency active: N/A    Outpatient therapy:Dr. Kumar's office    Do you have all of your medications: yes    Changes in medications: none   Reinforced the following discharge instructions,     1. ( x ) Post Discharge Instructions for HOME/SNF  Elevate extremity if swelling occurs  Continue the exercise program as prescribed by PT  Use walker, crutches or cane with weightbearing instructions as indicated by Dr. Maame Be not ambulate without assistance until cleared to do so by PT  Use Spirometer every 2 hrs while awake  2. ( x ) Initiate bowel care with the following medications Over-the-Counter Senokot (or Over-the-Counter Colace  (Docusate Sodium) 1-2 tabs by mouth twice daily, continue while on narcotics, hold for loose stools. 3. ( x ) Admit s/p ( X ) left ( x ) Minimally Invasive TKA  4. ( x ) Physical Therapy Orders Range-of-Motion, strengthening, gait training, ADL's. May discontinue therapy  when full extension is obtained and flexion is greater than 120 degrees. ( X ) Outpatient physical therapy 3 times per week for 6 weeks.   5. ( x ) Weight bearing/limitations  ( X ) left ( x ) lower extremity  ( x ) Full weight bearing  6. ( x) Dressing/wound care  (X ) Remove Mepilex (the large clear dressing with a silver strip in the middle) dressing on post-op day 7.  - It is important to keep your incision covered for 2 weeks after surgery. After the Mepilex (large clear dressing)  dressing is removed on post-op day 7, cover your incision with sterile gauze and tegaderm for another 7 days. You may  change this dressing as needed when moist/wet. - There is a mesh called Prineo underneath the larger dressing. This mesh is surgically glued over your incision. Leave this mesh in place until you see Dr. Shaye Angel in the office for your 3 week post-operative appointment. You may shower. No tub baths. Do not scrub wound. Pat dry with soft towel. Nati Angel does not utilize home healthcare or home nursing. It is not needed after this procedure. 7. ( x ) DVT PROPHYLAXIS -  ( x ) Thigh/knee high jaki hose bilateral lower extremities, OFF AT NIGHT  ( X ) Resume Plavix as prescribed by your physician  8. Take all medications ordered from Dr. Chema Gentile office as directed at your Pre-op Appointment    Follow up appointments:    No future appointments.     Angie Gupta MSN, MA, RN  Care Transition Coordinator  [phone]

## 2019-05-01 ENCOUNTER — CARE COORDINATION (OUTPATIENT)
Dept: CASE MANAGEMENT | Age: 79
End: 2019-05-01

## 2019-05-01 NOTE — CARE COORDINATION
Patient: Benoit Young   Patient : 1940  MRN: <[id number]>    Reason for Admission:  3/26/2019  PROCEDURE(S) PERFORMED: Right total knee arthroplasty    SURGEON: Yessy Paige M.D.   Facility: [facility], [facility])    Attempted to reach patient by phone for [facility] care transition follow up call. No answer , left a message on voicemail with reason for call and contact information.      Theresa Saucedo MSN, MA, RN  Care Transition Coordinator  [phone]

## 2019-05-21 ENCOUNTER — CARE COORDINATION (OUTPATIENT)
Dept: CASE MANAGEMENT | Age: 79
End: 2019-05-21

## 2019-05-31 ENCOUNTER — CARE COORDINATION (OUTPATIENT)
Dept: CASE MANAGEMENT | Age: 79
End: 2019-05-31

## 2019-06-05 ENCOUNTER — CARE COORDINATION (OUTPATIENT)
Dept: CASE MANAGEMENT | Age: 79
End: 2019-06-05

## 2019-06-05 NOTE — CARE COORDINATION
Patient: Deidra Buitrago   Patient : 1940  MRN: <[id number]>    Reason for Admission:  3/26/2019  PROCEDURE(S) PERFORMED: Right total knee arthroplasty    SURGEON: Faisal Ibarra M.D.   Facility: [facility] [place] with patient's spouse    Incision status: Healed completely, no concerns    Edema/Swelling: none    Pain level and status: None    Use of pain medications: None    Bowels: No concerns    Home Care Agency active: N/A     Outpatient therapy: completed    Do you have all of your medications: None    Changes in medications: None    States No additional calls . Care transition completed.     Arben Petersen MSN, MA, RN  Care Transition Coordinator  [phone]

## 2019-09-14 ENCOUNTER — HOSPITAL ENCOUNTER (OUTPATIENT)
Age: 79
Setting detail: OBSERVATION
Discharge: HOME | End: 2019-09-15
Attending: EMERGENCY MEDICINE | Admitting: INTERNAL MEDICINE
Payer: MEDICARE

## 2019-09-14 ENCOUNTER — APPOINTMENT (OUTPATIENT)
Dept: CT IMAGING | Age: 79
End: 2019-09-14
Payer: MEDICARE

## 2019-09-14 DIAGNOSIS — R41.82 ALTERED MENTAL STATUS, UNSPECIFIED ALTERED MENTAL STATUS TYPE: Primary | ICD-10-CM

## 2019-09-14 PROBLEM — G45.9 TIA (TRANSIENT ISCHEMIC ATTACK): Status: ACTIVE | Noted: 2019-09-14

## 2019-09-14 LAB
ANION GAP SERPL CALCULATED.3IONS-SCNC: 12 MMOL/L (ref 3–16)
APTT: 34.2 SEC (ref 26–36)
BASOPHILS ABSOLUTE: 0.1 K/UL (ref 0–0.2)
BASOPHILS RELATIVE PERCENT: 0.7 %
BILIRUBIN URINE: NEGATIVE
BLOOD, URINE: NEGATIVE
BUN BLDV-MCNC: 32 MG/DL (ref 7–20)
CALCIUM SERPL-MCNC: 9.1 MG/DL (ref 8.3–10.6)
CHLORIDE BLD-SCNC: 107 MMOL/L (ref 99–110)
CHOLESTEROL, TOTAL: 105 MG/DL (ref 0–199)
CLARITY: CLEAR
CO2: 22 MMOL/L (ref 21–32)
COLOR: YELLOW
CREAT SERPL-MCNC: 1.1 MG/DL (ref 0.8–1.3)
EKG ATRIAL RATE: 59 BPM
EKG DIAGNOSIS: NORMAL
EKG P AXIS: 63 DEGREES
EKG Q-T INTERVAL: 468 MS
EKG QRS DURATION: 134 MS
EKG QTC CALCULATION (BAZETT): 468 MS
EKG R AXIS: 64 DEGREES
EKG T AXIS: 46 DEGREES
EKG VENTRICULAR RATE: 60 BPM
EOSINOPHILS ABSOLUTE: 0.2 K/UL (ref 0–0.6)
EOSINOPHILS RELATIVE PERCENT: 2.7 %
GFR AFRICAN AMERICAN: >60
GFR NON-AFRICAN AMERICAN: >60
GLUCOSE BLD-MCNC: 123 MG/DL (ref 70–99)
GLUCOSE BLD-MCNC: 147 MG/DL (ref 70–99)
GLUCOSE BLD-MCNC: 88 MG/DL (ref 70–99)
GLUCOSE BLD-MCNC: 91 MG/DL (ref 70–99)
GLUCOSE URINE: NEGATIVE MG/DL
HCT VFR BLD CALC: 39.2 % (ref 40.5–52.5)
HDLC SERPL-MCNC: 45 MG/DL (ref 40–60)
HEMOGLOBIN: 12.9 G/DL (ref 13.5–17.5)
INR BLD: 0.96 (ref 0.86–1.14)
KETONES, URINE: NEGATIVE MG/DL
LDL CHOLESTEROL CALCULATED: 31 MG/DL
LEUKOCYTE ESTERASE, URINE: NEGATIVE
LYMPHOCYTES ABSOLUTE: 1.6 K/UL (ref 1–5.1)
LYMPHOCYTES RELATIVE PERCENT: 21.5 %
MCH RBC QN AUTO: 27 PG (ref 26–34)
MCHC RBC AUTO-ENTMCNC: 33 G/DL (ref 31–36)
MCV RBC AUTO: 81.9 FL (ref 80–100)
MICROSCOPIC EXAMINATION: NORMAL
MONOCYTES ABSOLUTE: 0.5 K/UL (ref 0–1.3)
MONOCYTES RELATIVE PERCENT: 6.2 %
NEUTROPHILS ABSOLUTE: 5.2 K/UL (ref 1.7–7.7)
NEUTROPHILS RELATIVE PERCENT: 68.9 %
NITRITE, URINE: NEGATIVE
PDW BLD-RTO: 16.9 % (ref 12.4–15.4)
PERFORMED ON: ABNORMAL
PERFORMED ON: ABNORMAL
PERFORMED ON: NORMAL
PH UA: 6 (ref 5–8)
PLATELET # BLD: 204 K/UL (ref 135–450)
PMV BLD AUTO: 7.9 FL (ref 5–10.5)
POTASSIUM REFLEX MAGNESIUM: 4.9 MMOL/L (ref 3.5–5.1)
PROTEIN UA: NEGATIVE MG/DL
PROTHROMBIN TIME: 10.9 SEC (ref 9.8–13)
RBC # BLD: 4.79 M/UL (ref 4.2–5.9)
SODIUM BLD-SCNC: 141 MMOL/L (ref 136–145)
SPECIFIC GRAVITY UA: 1.02 (ref 1–1.03)
TRIGL SERPL-MCNC: 143 MG/DL (ref 0–150)
TROPONIN: <0.01 NG/ML
URINE TYPE: NORMAL
UROBILINOGEN, URINE: 0.2 E.U./DL
VLDLC SERPL CALC-MCNC: 29 MG/DL
WBC # BLD: 7.6 K/UL (ref 4–11)

## 2019-09-14 PROCEDURE — 36415 COLL VENOUS BLD VENIPUNCTURE: CPT

## 2019-09-14 PROCEDURE — G0378 HOSPITAL OBSERVATION PER HR: HCPCS

## 2019-09-14 PROCEDURE — 99285 EMERGENCY DEPT VISIT HI MDM: CPT

## 2019-09-14 PROCEDURE — 84484 ASSAY OF TROPONIN QUANT: CPT

## 2019-09-14 PROCEDURE — 96372 THER/PROPH/DIAG INJ SC/IM: CPT

## 2019-09-14 PROCEDURE — 81003 URINALYSIS AUTO W/O SCOPE: CPT

## 2019-09-14 PROCEDURE — 93005 ELECTROCARDIOGRAM TRACING: CPT | Performed by: EMERGENCY MEDICINE

## 2019-09-14 PROCEDURE — 83036 HEMOGLOBIN GLYCOSYLATED A1C: CPT

## 2019-09-14 PROCEDURE — 6370000000 HC RX 637 (ALT 250 FOR IP): Performed by: INTERNAL MEDICINE

## 2019-09-14 PROCEDURE — 2580000003 HC RX 258: Performed by: INTERNAL MEDICINE

## 2019-09-14 PROCEDURE — 85730 THROMBOPLASTIN TIME PARTIAL: CPT

## 2019-09-14 PROCEDURE — 70496 CT ANGIOGRAPHY HEAD: CPT

## 2019-09-14 PROCEDURE — 6360000002 HC RX W HCPCS: Performed by: INTERNAL MEDICINE

## 2019-09-14 PROCEDURE — 80048 BASIC METABOLIC PNL TOTAL CA: CPT

## 2019-09-14 PROCEDURE — 6360000004 HC RX CONTRAST MEDICATION: Performed by: PHYSICIAN ASSISTANT

## 2019-09-14 PROCEDURE — 85025 COMPLETE CBC W/AUTO DIFF WBC: CPT

## 2019-09-14 PROCEDURE — 70450 CT HEAD/BRAIN W/O DYE: CPT

## 2019-09-14 PROCEDURE — 85610 PROTHROMBIN TIME: CPT

## 2019-09-14 PROCEDURE — 80061 LIPID PANEL: CPT

## 2019-09-14 PROCEDURE — 70498 CT ANGIOGRAPHY NECK: CPT

## 2019-09-14 RX ORDER — SODIUM CHLORIDE 0.9 % (FLUSH) 0.9 %
10 SYRINGE (ML) INJECTION PRN
Status: DISCONTINUED | OUTPATIENT
Start: 2019-09-14 | End: 2019-09-15 | Stop reason: HOSPADM

## 2019-09-14 RX ORDER — ATORVASTATIN CALCIUM 80 MG/1
80 TABLET, FILM COATED ORAL NIGHTLY
Status: DISCONTINUED | OUTPATIENT
Start: 2019-09-14 | End: 2019-09-15 | Stop reason: HOSPADM

## 2019-09-14 RX ORDER — SODIUM CHLORIDE 0.9 % (FLUSH) 0.9 %
10 SYRINGE (ML) INJECTION EVERY 12 HOURS SCHEDULED
Status: DISCONTINUED | OUTPATIENT
Start: 2019-09-14 | End: 2019-09-15 | Stop reason: HOSPADM

## 2019-09-14 RX ORDER — ONDANSETRON 2 MG/ML
4 INJECTION INTRAMUSCULAR; INTRAVENOUS EVERY 6 HOURS PRN
Status: DISCONTINUED | OUTPATIENT
Start: 2019-09-14 | End: 2019-09-15 | Stop reason: HOSPADM

## 2019-09-14 RX ORDER — LISINOPRIL 10 MG/1
10 TABLET ORAL DAILY
Status: DISCONTINUED | OUTPATIENT
Start: 2019-09-14 | End: 2019-09-15 | Stop reason: HOSPADM

## 2019-09-14 RX ORDER — ZOLPIDEM TARTRATE 5 MG/1
5 TABLET ORAL NIGHTLY PRN
Status: DISCONTINUED | OUTPATIENT
Start: 2019-09-14 | End: 2019-09-15 | Stop reason: HOSPADM

## 2019-09-14 RX ORDER — DEXTROSE MONOHYDRATE 25 G/50ML
12.5 INJECTION, SOLUTION INTRAVENOUS PRN
Status: DISCONTINUED | OUTPATIENT
Start: 2019-09-14 | End: 2019-09-15 | Stop reason: HOSPADM

## 2019-09-14 RX ORDER — TAMSULOSIN HYDROCHLORIDE 0.4 MG/1
0.4 CAPSULE ORAL DAILY
Status: DISCONTINUED | OUTPATIENT
Start: 2019-09-14 | End: 2019-09-15 | Stop reason: HOSPADM

## 2019-09-14 RX ORDER — NICOTINE POLACRILEX 4 MG
15 LOZENGE BUCCAL PRN
Status: DISCONTINUED | OUTPATIENT
Start: 2019-09-14 | End: 2019-09-15 | Stop reason: HOSPADM

## 2019-09-14 RX ORDER — CLOPIDOGREL BISULFATE 75 MG/1
75 TABLET ORAL DAILY
Status: DISCONTINUED | OUTPATIENT
Start: 2019-09-14 | End: 2019-09-15 | Stop reason: HOSPADM

## 2019-09-14 RX ORDER — ASPIRIN 81 MG/1
81 TABLET, CHEWABLE ORAL DAILY
Status: DISCONTINUED | OUTPATIENT
Start: 2019-09-14 | End: 2019-09-15 | Stop reason: HOSPADM

## 2019-09-14 RX ORDER — DEXTROSE MONOHYDRATE 50 MG/ML
100 INJECTION, SOLUTION INTRAVENOUS PRN
Status: DISCONTINUED | OUTPATIENT
Start: 2019-09-14 | End: 2019-09-15 | Stop reason: HOSPADM

## 2019-09-14 RX ORDER — SERTRALINE HYDROCHLORIDE 100 MG/1
100 TABLET, FILM COATED ORAL DAILY
Status: DISCONTINUED | OUTPATIENT
Start: 2019-09-14 | End: 2019-09-15 | Stop reason: HOSPADM

## 2019-09-14 RX ADMIN — ATORVASTATIN CALCIUM 80 MG: 80 TABLET, FILM COATED ORAL at 20:24

## 2019-09-14 RX ADMIN — ZOLPIDEM TARTRATE 5 MG: 5 TABLET ORAL at 22:11

## 2019-09-14 RX ADMIN — Medication 10 ML: at 20:28

## 2019-09-14 RX ADMIN — TAMSULOSIN HYDROCHLORIDE 0.4 MG: 0.4 CAPSULE ORAL at 17:35

## 2019-09-14 RX ADMIN — LINAGLIPTIN 5 MG: 5 TABLET, FILM COATED ORAL at 17:35

## 2019-09-14 RX ADMIN — ENOXAPARIN SODIUM 40 MG: 40 INJECTION SUBCUTANEOUS at 17:35

## 2019-09-14 RX ADMIN — ASPIRIN 81 MG 81 MG: 81 TABLET ORAL at 17:35

## 2019-09-14 RX ADMIN — SERTRALINE HYDROCHLORIDE 100 MG: 100 TABLET ORAL at 17:35

## 2019-09-14 RX ADMIN — LISINOPRIL 10 MG: 10 TABLET ORAL at 17:35

## 2019-09-14 RX ADMIN — IOPAMIDOL 80 ML: 755 INJECTION, SOLUTION INTRAVENOUS at 11:16

## 2019-09-14 RX ADMIN — CLOPIDOGREL BISULFATE 75 MG: 75 TABLET, FILM COATED ORAL at 17:35

## 2019-09-14 NOTE — ED NOTES
Report called to [facility] who will be taking care of the patient in room 6246. Pt will be prepared for transport.      Magda Trevino RN  09/14/19 9333

## 2019-09-14 NOTE — ED PROVIDER NOTES
angiography of the brain with contrast:      The anterior cerebral, middle cerebral, posterior cerebral arteries widely patent. The basilar artery is widely patent. No extra-axial fluid collections. Dense calcification along the rightward falx identified. Dural venous sinuses appear patent. IMPRESSION:.   1. Unremarkable intracranial arterial vascularity   2.  Patent dural venous sinuses        LABS:   Results for orders placed or performed during the hospital encounter of 09/14/19   CBC Auto Differential   Result Value Ref Range    WBC 7.6 4.0 - 11.0 K/uL    RBC 4.79 4.20 - 5.90 M/uL    Hemoglobin 12.9 (L) 13.5 - 17.5 g/dL    Hematocrit 39.2 (L) 40.5 - 52.5 %    MCV 81.9 80.0 - 100.0 fL    MCH 27.0 26.0 - 34.0 pg    MCHC 33.0 31.0 - 36.0 g/dL    RDW 16.9 (H) 12.4 - 15.4 %    Platelets 371 237 - 609 K/uL    MPV 7.9 5.0 - 10.5 fL    Neutrophils % 68.9 %    Lymphocytes % 21.5 %    Monocytes % 6.2 %    Eosinophils % 2.7 %    Basophils % 0.7 %    Neutrophils Absolute 5.2 1.7 - 7.7 K/uL    Lymphocytes Absolute 1.6 1.0 - 5.1 K/uL    Monocytes Absolute 0.5 0.0 - 1.3 K/uL    Eosinophils Absolute 0.2 0.0 - 0.6 K/uL    Basophils Absolute 0.1 0.0 - 0.2 K/uL   Basic Metabolic Panel w/ Reflex to MG   Result Value Ref Range    Sodium 141 136 - 145 mmol/L    Potassium reflex Magnesium 4.9 3.5 - 5.1 mmol/L    Chloride 107 99 - 110 mmol/L    CO2 22 21 - 32 mmol/L    Anion Gap 12 3 - 16    Glucose 88 70 - 99 mg/dL    BUN 32 (H) 7 - 20 mg/dL    CREATININE 1.1 0.8 - 1.3 mg/dL    GFR Non-African American >60 >60    GFR African American >60 >60    Calcium 9.1 8.3 - 10.6 mg/dL   Troponin   Result Value Ref Range    Troponin <0.01 <0.01 ng/mL   APTT   Result Value Ref Range    aPTT 34.2 26.0 - 36.0 sec   Protime-INR   Result Value Ref Range    Protime 10.9 9.8 - 13.0 sec    INR 0.96 0.86 - 1.14   Urinalysis, reflex to microscopic   Result Value Ref Range    Color, UA Yellow Straw/Yellow    Clarity, UA Clear Clear    Glucose, Ur

## 2019-09-14 NOTE — ED NOTES
Swallow screening passed. Pt swallowed 3+ ounces of water without any signs of difficulty.      Laura Jhaveri RN  09/14/19 6938

## 2019-09-14 NOTE — H&P
tamsulosin (FLOMAX) 0.4 MG capsule Take 0.4 mg by mouth daily    Yes Historical Provider, MD   aspirin 81 MG tablet Take 81 mg by mouth daily. Yes Historical Provider, MD   metFORMIN (GLUCOPHAGE) 1000 MG tablet Take 1,000 mg by mouth 2 times daily (with meals)    Yes Historical Provider, MD   clopidogrel (PLAVIX) 75 MG tablet Take 75 mg by mouth daily. Yes Historical Provider, MD   zolpidem (AMBIEN) 10 MG tablet Take  by mouth nightly as needed. Yes Historical Provider, MD       Allergies:  Patient has no known allergies. Social History:      The patient currently lives at home with wife    TOBACCO:   reports that he has quit smoking. He has never used smokeless tobacco.  ETOH:   reports that he drank alcohol. Family History:      Reviewed in detail and negative for DM, CAD, Cancer, CVA. Positive as follows:        Problem Relation Age of Onset    Diabetes Father     Heart Disease Father     Blindness Father        REVIEW OF SYSTEMS:   Pertinent positives as noted in the HPI. Confusion. All other systems reviewed and negative. PHYSICAL EXAM PERFORMED:    /85   Pulse 60   Temp 97.6 °F (36.4 °C) (Oral)   Resp 18   Ht 6' (1.829 m)   Wt 200 lb (90.7 kg)   SpO2 100%   BMI 27.12 kg/m²     General appearance:  No apparent distress, appears stated age and cooperative. HEENT:  Normal cephalic, atraumatic without obvious deformity. Pupils equal, round, and reactive to light. Extra ocular muscles intact. Conjunctivae/corneas clear. Neck: Supple, with full range of motion. No jugular venous distention. Trachea midline. Respiratory:  Normal respiratory effort. Clear to auscultation, bilaterally without Rales/Wheezes/Rhonchi. Cardiovascular:  Regular rate and rhythm with normal S1/S2 without murmurs, rubs or gallops. Abdomen: Soft, non-tender, non-distended with normal bowel sounds. Musculoskeletal:  No clubbing, cyanosis or edema bilaterally.   Full range of motion without Status: Full code    PT/OT Eval Status: requested    Dispo - observation       Lacey Rowe MD    Thank you Irene Whaley for the opportunity to be involved in this patient's care. If you have any questions or concerns please feel free to contact me at [phone].

## 2019-09-15 VITALS
SYSTOLIC BLOOD PRESSURE: 97 MMHG | BODY MASS INDEX: 27.09 KG/M2 | WEIGHT: 200 LBS | DIASTOLIC BLOOD PRESSURE: 70 MMHG | RESPIRATION RATE: 16 BRPM | HEIGHT: 72 IN | TEMPERATURE: 97.8 F | HEART RATE: 60 BPM | OXYGEN SATURATION: 96 %

## 2019-09-15 LAB
ESTIMATED AVERAGE GLUCOSE: 148.5 MG/DL
GLUCOSE BLD-MCNC: 129 MG/DL (ref 70–99)
HBA1C MFR BLD: 6.8 %
HCT VFR BLD CALC: 37.8 % (ref 40.5–52.5)
HEMOGLOBIN: 12.7 G/DL (ref 13.5–17.5)
MCH RBC QN AUTO: 27.4 PG (ref 26–34)
MCHC RBC AUTO-ENTMCNC: 33.7 G/DL (ref 31–36)
MCV RBC AUTO: 81.2 FL (ref 80–100)
PDW BLD-RTO: 16.8 % (ref 12.4–15.4)
PERFORMED ON: ABNORMAL
PLATELET # BLD: 191 K/UL (ref 135–450)
PMV BLD AUTO: 7.7 FL (ref 5–10.5)
RBC # BLD: 4.66 M/UL (ref 4.2–5.9)
WBC # BLD: 7.4 K/UL (ref 4–11)

## 2019-09-15 PROCEDURE — 36415 COLL VENOUS BLD VENIPUNCTURE: CPT

## 2019-09-15 PROCEDURE — 97535 SELF CARE MNGMENT TRAINING: CPT

## 2019-09-15 PROCEDURE — 85027 COMPLETE CBC AUTOMATED: CPT

## 2019-09-15 PROCEDURE — 6360000002 HC RX W HCPCS: Performed by: INTERNAL MEDICINE

## 2019-09-15 PROCEDURE — 96372 THER/PROPH/DIAG INJ SC/IM: CPT

## 2019-09-15 PROCEDURE — 97116 GAIT TRAINING THERAPY: CPT

## 2019-09-15 PROCEDURE — 97162 PT EVAL MOD COMPLEX 30 MIN: CPT

## 2019-09-15 PROCEDURE — 6370000000 HC RX 637 (ALT 250 FOR IP): Performed by: INTERNAL MEDICINE

## 2019-09-15 PROCEDURE — 92610 EVALUATE SWALLOWING FUNCTION: CPT

## 2019-09-15 PROCEDURE — 2580000003 HC RX 258: Performed by: INTERNAL MEDICINE

## 2019-09-15 PROCEDURE — 97165 OT EVAL LOW COMPLEX 30 MIN: CPT

## 2019-09-15 PROCEDURE — G0378 HOSPITAL OBSERVATION PER HR: HCPCS

## 2019-09-15 RX ADMIN — SERTRALINE HYDROCHLORIDE 100 MG: 100 TABLET ORAL at 08:27

## 2019-09-15 RX ADMIN — Medication 10 ML: at 08:27

## 2019-09-15 RX ADMIN — ENOXAPARIN SODIUM 40 MG: 40 INJECTION SUBCUTANEOUS at 08:27

## 2019-09-15 RX ADMIN — ASPIRIN 81 MG 81 MG: 81 TABLET ORAL at 08:27

## 2019-09-15 RX ADMIN — LINAGLIPTIN 5 MG: 5 TABLET, FILM COATED ORAL at 08:27

## 2019-09-15 RX ADMIN — LISINOPRIL 10 MG: 10 TABLET ORAL at 08:27

## 2019-09-15 RX ADMIN — TAMSULOSIN HYDROCHLORIDE 0.4 MG: 0.4 CAPSULE ORAL at 08:27

## 2019-09-15 RX ADMIN — CLOPIDOGREL BISULFATE 75 MG: 75 TABLET, FILM COATED ORAL at 08:27

## 2019-09-15 NOTE — PROGRESS NOTES
Independent       Cognition  Overall Cognitive Status: [facility]  Cognition Comment: Mildly impulsive at first however this improved w/ cues for safety           Sensation  Overall Sensation Status: WFL        LUE AROM (degrees)  LUE AROM : WFL  RUE AROM (degrees)  RUE AROM : WFL                      Plan  D/C from acute OT        G-Code     OutComes Score                                                  AM-PAC Score        AM-PAC Inpatient Daily Activity Raw Score: 23 (09/15/19 1113)  AM-PAC Inpatient ADL T-Scale Score : 51.12 (09/15/19 1113)  ADL Inpatient CMS 0-100% Score: 15.86 (09/15/19 1113)  ADL Inpatient CMS G-Code Modifier : CI (09/15/19 1113)    Goals  Patient Goals   Patient goals : no goals created, initialy eval and d/c        Therapy Time   Individual Concurrent Group Co-treatment   Time In 0740         Time Out 0810         Minutes 30         Timed Code Treatment Minutes: Susan Kennedy 74, OT

## 2019-09-15 NOTE — PROGRESS NOTES
Speech Language Pathology  Facility/Department: [facility] 5T ORTHO/NEURO   CLINICAL BEDSIDE SWALLOW EVALUATION  Late entry for 805     NAME: Elsa Hong  : 1940  MRN: [id number]    ADMISSION DATE: 2019  ADMITTING DIAGNOSIS: has Arthritis of left knee and TIA (transient ischemic attack) on their problem list.  ONSET DATE: 19    Recent Chest Xray not completed    CT of 19  No evidence for acute intracranial process. No bleed or shift.             Date of Eval: 9/15/2019  Evaluating Therapist: Charlene Copeland    Current Diet level:  Current Diet : Regular  Current Liquid Diet : Thin    Primary Complaint  Patient Complaint: denies speech or swallowing problems    Pain: denies     Reason for Referral  Elsa Hong was referred for a bedside swallow evaluation to assess the efficiency of his swallow function, identify signs and symptoms of aspiration and make recommendations regarding safe dietary consistencies, effective compensatory strategies, and safe eating environment. History Of Present Illness:   Per MD notes:  \"72 y.o. male who presented to [facility], [facility]. after developing confusion and left facial droop, witnessed by his wife  He was getting ready to leave the house. Took tylenol and Ultram as usual. On the way to their destination, patient became groggy. Woke up a few minutes ago. Voice was hoarse when he woke up. Wife brought the patient to the ED. Was back to baseline at that time, and CT head was negative  Never had anything like this before  No accompanying synptom  Nothing else that makes the patient better or worse\"     Impression  Dysphagia Diagnosis: Swallow function appears grossly intact  Dysphagia Impression : Pt alert, oriented, follows commands and responds appropriately to questions. Pt states his speech is his baseline, though at times appeared to be some deliberate with articulation, though speech is fully intelligible.   Pt states he doesn't really recall what Phase Dysfunction  WFL    Indicators of Pharyngeal Phase Dysfunction  WFL    Prognosis  Prognosis  Prognosis for safe diet advancement: good  Individuals consulted  Consulted and agree with results and recommendations: Patient;RN    Education  Patient Education: Pt educated to purpose of visit  Patient Education Response: Verbalizes understanding  Safety Devices in place: Yes  Type of devices: Call light within reach      Therapy Time  SLP Individual Minutes  Time In: 0750  Time Out: 0805  Minutes: 15     Plan:  Recommended diet: cont regular diet/thin liquids - if any s/s of aspiration emerge, or there is respiratory decline,  make NPO until further evaluated by SLP  Will monitor speech and need for eval - pt states baseline  Pt therapy goal: denies any problems  Pt dc goal: to go home, \"I am going home. \"    Niraj Adam M.S./[facility]-SLP #[id number]  Pg.  # [phone]  Needs met prior to leaving room, call light within reach, d/w GEORGE Delaney  This document will serve as a dc summary if pt dc prior to next visit  9/15/2019 9:41 AM

## 2019-09-15 NOTE — CONSULTS
NEUROLOGY DICTATED. LIKELY TIQ/TRANSIENT GLOBAL AMNESIA. DID NOT EAT BKFST THE MORNING OF THE EVENT. CONTINUE ANTIPLATELET THERAPY. CT AND CTa REVIEWED, HEAD AND NECK.     Jeanie Nugents

## 2019-11-07 ENCOUNTER — HOSPITAL ENCOUNTER (OUTPATIENT)
Dept: CT IMAGING | Age: 79
Discharge: HOME | End: 2019-11-07
Payer: MEDICARE

## 2019-11-07 DIAGNOSIS — M54.10 RADICULOPATHY, UNSPECIFIED SPINAL REGION: ICD-10-CM

## 2019-11-07 PROCEDURE — 72131 CT LUMBAR SPINE W/O DYE: CPT

## 2019-12-10 ENCOUNTER — OFFICE VISIT (OUTPATIENT)
Dept: ORTHOPEDIC SURGERY | Age: 79
End: 2019-12-10
Payer: MEDICARE

## 2019-12-10 VITALS — HEIGHT: 72 IN | RESPIRATION RATE: 17 BRPM | WEIGHT: 199.96 LBS | BODY MASS INDEX: 27.08 KG/M2

## 2019-12-10 DIAGNOSIS — M65.30 TRIGGER FINGER OF RIGHT HAND, UNSPECIFIED FINGER: ICD-10-CM

## 2019-12-10 DIAGNOSIS — R20.0 NUMBNESS OF RIGHT HAND: Primary | ICD-10-CM

## 2019-12-10 DIAGNOSIS — G56.03 CARPAL TUNNEL SYNDROME ON BOTH SIDES: ICD-10-CM

## 2019-12-10 PROCEDURE — 4040F PNEUMOC VAC/ADMIN/RCVD: CPT | Performed by: ORTHOPAEDIC SURGERY

## 2019-12-10 PROCEDURE — 99203 OFFICE O/P NEW LOW 30 MIN: CPT | Performed by: ORTHOPAEDIC SURGERY

## 2019-12-10 PROCEDURE — G8427 DOCREV CUR MEDS BY ELIG CLIN: HCPCS | Performed by: ORTHOPAEDIC SURGERY

## 2019-12-10 PROCEDURE — 1123F ACP DISCUSS/DSCN MKR DOCD: CPT | Performed by: ORTHOPAEDIC SURGERY

## 2019-12-10 PROCEDURE — 1036F TOBACCO NON-USER: CPT | Performed by: ORTHOPAEDIC SURGERY

## 2019-12-10 PROCEDURE — G8417 CALC BMI ABV UP PARAM F/U: HCPCS | Performed by: ORTHOPAEDIC SURGERY

## 2019-12-10 PROCEDURE — G8484 FLU IMMUNIZE NO ADMIN: HCPCS | Performed by: ORTHOPAEDIC SURGERY

## 2019-12-10 RX ORDER — SILDENAFIL 100 MG/1
TABLET, FILM COATED ORAL
COMMUNITY
Start: 2015-02-06

## 2019-12-26 ENCOUNTER — OFFICE VISIT (OUTPATIENT)
Dept: ORTHOPEDIC SURGERY | Age: 79
End: 2019-12-26
Payer: MEDICARE

## 2019-12-26 DIAGNOSIS — R20.2 PARESTHESIA OF RIGHT UPPER EXTREMITY: Primary | ICD-10-CM

## 2019-12-26 PROCEDURE — 95909 NRV CNDJ TST 5-6 STUDIES: CPT | Performed by: PHYSICAL MEDICINE & REHABILITATION

## 2019-12-26 PROCEDURE — 95886 MUSC TEST DONE W/N TEST COMP: CPT | Performed by: PHYSICAL MEDICINE & REHABILITATION

## 2020-10-15 ENCOUNTER — HOSPITAL ENCOUNTER (OUTPATIENT)
Dept: GENERAL RADIOLOGY | Age: 80
Discharge: HOME | End: 2020-10-15
Payer: MEDICARE

## 2020-10-15 PROCEDURE — 73630 X-RAY EXAM OF FOOT: CPT

## 2020-10-27 ENCOUNTER — HOSPITAL ENCOUNTER (OUTPATIENT)
Dept: CT IMAGING | Age: 80
Discharge: HOME | End: 2020-10-27
Payer: MEDICARE

## 2020-10-27 PROCEDURE — 73700 CT LOWER EXTREMITY W/O DYE: CPT

## 2021-02-15 ENCOUNTER — HOSPITAL ENCOUNTER (OUTPATIENT)
Dept: GENERAL RADIOLOGY | Age: 81
Discharge: HOME | End: 2021-02-15
Payer: MEDICARE

## 2021-02-15 DIAGNOSIS — M25.532 LEFT WRIST PAIN: ICD-10-CM

## 2021-02-15 PROCEDURE — 73110 X-RAY EXAM OF WRIST: CPT

## 2021-10-15 ENCOUNTER — CLINICAL DOCUMENTATION (OUTPATIENT)
Dept: OTHER | Age: 81
End: 2021-10-15

## 2022-03-31 ENCOUNTER — HOSPITAL ENCOUNTER (OUTPATIENT)
Dept: ULTRASOUND IMAGING | Age: 82
Discharge: HOME | End: 2022-03-31
Payer: MEDICARE

## 2022-03-31 DIAGNOSIS — N18.30 STAGE 3 CHRONIC KIDNEY DISEASE, UNSPECIFIED WHETHER STAGE 3A OR 3B CKD (HCC): ICD-10-CM

## 2022-03-31 PROCEDURE — 76770 US EXAM ABDO BACK WALL COMP: CPT

## 2022-06-09 ENCOUNTER — HOSPITAL ENCOUNTER (OUTPATIENT)
Dept: VASCULAR LAB | Age: 82
Discharge: HOME | End: 2022-06-09
Payer: MEDICARE

## 2022-06-09 DIAGNOSIS — R60.0 LOCALIZED EDEMA: ICD-10-CM

## 2022-06-09 PROCEDURE — 93971 EXTREMITY STUDY: CPT

## 2022-07-27 ENCOUNTER — OFFICE VISIT (OUTPATIENT)
Dept: ENT CLINIC | Age: 82
End: 2022-07-27
Payer: MEDICARE

## 2022-07-27 ENCOUNTER — TELEPHONE (OUTPATIENT)
Dept: ENT CLINIC | Age: 82
End: 2022-07-27

## 2022-07-27 VITALS
SYSTOLIC BLOOD PRESSURE: 107 MMHG | DIASTOLIC BLOOD PRESSURE: 61 MMHG | WEIGHT: 212.2 LBS | HEART RATE: 65 BPM | TEMPERATURE: 97.1 F | HEIGHT: 72 IN | BODY MASS INDEX: 28.74 KG/M2

## 2022-07-27 DIAGNOSIS — H90.2 EXTERNAL EAR CONDUCTIVE HEARING LOSS: ICD-10-CM

## 2022-07-27 DIAGNOSIS — H61.23 BILATERAL IMPACTED CERUMEN: Primary | ICD-10-CM

## 2022-07-27 PROCEDURE — 69210 REMOVE IMPACTED EAR WAX UNI: CPT | Performed by: OTOLARYNGOLOGY

## 2022-07-27 NOTE — TELEPHONE ENCOUNTER
Pt called says he wants to talk to Dr. Veronica Musa or someone about his ear. His ear has been sensitive to touch/sore, also feels like he can't hear well. I have scheduled the pt to see Dr. Veronica Musa tomorrow at 2:10 pm if needed. Pt has been told there is no audio here. \  Please call pt to discuss this issue if possible, thank you!

## 2022-07-27 NOTE — PROGRESS NOTES
FOLLOW UP VISIT:    CHIEF COMPLAINT: Hearing loss    INTERIM HISTORY: Woke up this morning with a complete loss of hearing in the left ear. Still has a sense of fullness in the left ear. No tinnitus. No vertigo. No ear pain or otorrhea. No associated upper respiratory infection. PAST MEDICAL HISTORY:   Social History     Tobacco Use   Smoking Status Former   Smokeless Tobacco Never                                                    Social History     Substance and Sexual Activity   Alcohol Use Not Currently    Comment: rarely                                                     Current Outpatient Medications:     BISOPROLOL FUMARATE PO, Take by mouth, Disp: , Rfl:     sildenafil (VIAGRA) 100 MG tablet, Take 1/2 or one tablet no more often than daily as needed. Indications: Erectile Dysfunction, Disp: , Rfl:     atorvastatin (LIPITOR) 80 MG tablet, Take 80 mg by mouth daily, Disp: , Rfl:     sertraline (ZOLOFT) 100 MG tablet, Take 100 mg by mouth daily, Disp: , Rfl:     glipiZIDE (GLUCOTROL) 10 MG tablet, Take 10 mg by mouth daily , Disp: , Rfl:     tamsulosin (FLOMAX) 0.4 MG capsule, Take 0.4 mg by mouth daily , Disp: , Rfl:     aspirin 81 MG tablet, Take 81 mg by mouth daily. , Disp: , Rfl:     metFORMIN (GLUCOPHAGE) 1000 MG tablet, Take 1,000 mg by mouth 2 times daily (with meals) , Disp: , Rfl:     clopidogrel (PLAVIX) 75 MG tablet, Take 75 mg by mouth daily. , Disp: , Rfl:     zolpidem (AMBIEN) 10 MG tablet, Take  by mouth nightly as needed. , Disp: , Rfl:                                                  Past Medical History:   Diagnosis Date    Arthritis     CAD (coronary artery disease)     Diabetes mellitus ([address]     Hearing loss     Hyperlipidemia     Hypertension     Kidney stone                                                     Past Surgical History:   Procedure Laterality Date    CARDIAC SURGERY      6 STENTS in one artery     CARPAL TUNNEL RELEASE Bilateral     CORONARY ARTERY BYPASS GRAFT  2005 double     JOINT REPLACEMENT Right 2010    knee    PACEMAKER INSERTION  01/2017    [employer]     PARATHYROIDECTOMY      TONSILLECTOMY      TOTAL KNEE ARTHROPLASTY Left 03/26/2019    LEFT TOTAL KNEE ARTHROPLASTY performed by Jovon Quigley MD at [facility]: Family history reviewed and except as pertinent to the interim history is not contributory. REVIEW OF SYSTEMS:  All pertinent positive and negative findings included in HPI. Otherwise, all other systems are reviewed and are negative    PHYSICAL EXAMINATION:   GENERAL: wdwn- no acute distress  RESPIRATORY: No stridor. COMMUNICATION :  Normal voice  MENTAL STATUS: Alert and oriented x3  HEAD AND FACE: No skin lesions of the face. EXTERNAL EARS AND NOSE: The external ears and nasal pyramid are normal.  FACIAL MUSCLES: All branches of the facial nerve intact. FACE PALPATION: Zygomatic arches and orbital rims are intact. No tenderness over the sinuses. EXTRAOCULAR MUSCLES: Intact with full range of motion. OTOSCOPY: Cerumen occludes both external auditory canals. TUNING FORKS:  Rinne ++ Yang midline at 512 Hz  INTRANASAL:  Septum midline, turbinates normal, meati clear. Cerumen removed from both external auditory canals using curettes and alligator forceps. A large amount on both sides but more on the left. Both tympanic membranes and middle ear spaces are normal.  Patient's hearing is returned to normal in the left ear. LIPS, TEETH, GINGIVA:  Normal mucosa  PHARYNX:  Normal  NECK:  No masses. LYMPH NODES: No cervical lymphadenopathy. SALIVARY GLANDS: Parotid and submandibular glands normal.  THYROID: No goiter or thyroid nodules palpable. IMPRESSION: Lateral impacted cerumen causing external ear conductive hearing loss on the left side. PLAN: Resolved after cerumen removal.    FOLLOW-UP: As needed.

## 2022-07-28 NOTE — PROGRESS NOTES
Cerumen removed from both the ears using curettes and alligator forceps. Tympanic membranes and middle ear spaces are normal.  Hearing is subjectively improved. Follow-up: As needed.

## 2022-12-21 LAB
GLUCOSE BLD-MCNC: 114 MG/DL
GLUCOSE BLD-MCNC: 118 MG/DL

## 2023-01-10 ENCOUNTER — OFFICE VISIT (OUTPATIENT)
Dept: ENT CLINIC | Age: 83
End: 2023-01-10
Payer: MEDICARE

## 2023-01-10 VITALS
DIASTOLIC BLOOD PRESSURE: 61 MMHG | TEMPERATURE: 97.7 F | WEIGHT: 212 LBS | SYSTOLIC BLOOD PRESSURE: 155 MMHG | HEART RATE: 61 BPM | HEIGHT: 70 IN | BODY MASS INDEX: 30.35 KG/M2

## 2023-01-10 DIAGNOSIS — J34.89 NASAL VESTIBULITIS: Primary | ICD-10-CM

## 2023-01-10 PROCEDURE — 1036F TOBACCO NON-USER: CPT | Performed by: OTOLARYNGOLOGY

## 2023-01-10 PROCEDURE — G8417 CALC BMI ABV UP PARAM F/U: HCPCS | Performed by: OTOLARYNGOLOGY

## 2023-01-10 PROCEDURE — 1123F ACP DISCUSS/DSCN MKR DOCD: CPT | Performed by: OTOLARYNGOLOGY

## 2023-01-10 PROCEDURE — 99213 OFFICE O/P EST LOW 20 MIN: CPT | Performed by: OTOLARYNGOLOGY

## 2023-01-10 PROCEDURE — G8427 DOCREV CUR MEDS BY ELIG CLIN: HCPCS | Performed by: OTOLARYNGOLOGY

## 2023-01-10 PROCEDURE — G8484 FLU IMMUNIZE NO ADMIN: HCPCS | Performed by: OTOLARYNGOLOGY

## 2023-01-10 NOTE — PROGRESS NOTES
FOLLOW UP VISIT:    CHIEF COMPLAINT: Nasal irritation. INTERIM HISTORY: 6 months of nasal irritation bilateral.  Left worse than right. Patient feels the need to scratch his nose. No bleeding from the nose or nasal obstruction. PAST MEDICAL HISTORY:   Social History     Tobacco Use   Smoking Status Former   Smokeless Tobacco Never                                                    Social History     Substance and Sexual Activity   Alcohol Use Not Currently    Comment: rarely                                                     Current Outpatient Medications:     mupirocin (BACTROBAN) 2 % ointment, Apply topically 3 times daily. , Disp: 15 g, Rfl: 2    BISOPROLOL FUMARATE PO, Take by mouth, Disp: , Rfl:     sildenafil (VIAGRA) 100 MG tablet, Take 1/2 or one tablet no more often than daily as needed. Indications: Erectile Dysfunction, Disp: , Rfl:     atorvastatin (LIPITOR) 80 MG tablet, Take 80 mg by mouth daily, Disp: , Rfl:     sertraline (ZOLOFT) 100 MG tablet, Take 100 mg by mouth daily, Disp: , Rfl:     glipiZIDE (GLUCOTROL) 10 MG tablet, Take 10 mg by mouth daily , Disp: , Rfl:     tamsulosin (FLOMAX) 0.4 MG capsule, Take 0.4 mg by mouth daily , Disp: , Rfl:     aspirin 81 MG tablet, Take 81 mg by mouth daily. , Disp: , Rfl:     metFORMIN (GLUCOPHAGE) 1000 MG tablet, Take 1,000 mg by mouth 2 times daily (with meals) , Disp: , Rfl:     clopidogrel (PLAVIX) 75 MG tablet, Take 75 mg by mouth daily. , Disp: , Rfl:     zolpidem (AMBIEN) 10 MG tablet, Take  by mouth nightly as needed. , Disp: , Rfl:                                                  Past Medical History:   Diagnosis Date    Arthritis     CAD (coronary artery disease)     Diabetes mellitus ([facility] [address]     Hearing loss     Hyperlipidemia     Hypertension     Kidney stone                                                     Past Surgical History:   Procedure Laterality Date    CARDIAC SURGERY      6 STENTS in one artery     CARPAL TUNNEL RELEASE Bilateral CATARACT REMOVAL      CORONARY ARTERY BYPASS GRAFT  2005    double     JOINT REPLACEMENT Right 2010    knee    PACEMAKER INSERTION  01/2017    [employer]     PARATHYROIDECTOMY      TONSILLECTOMY      TOTAL KNEE ARTHROPLASTY Left 03/26/2019    LEFT TOTAL KNEE ARTHROPLASTY performed by Ana Vega MD at [facility]: Family history reviewed and except as pertinent to the interim history is not contributory. REVIEW OF SYSTEMS:  All pertinent positive and negative findings included in HPI. Otherwise, all other systems are reviewed and are negative    PHYSICAL EXAMINATION:   GENERAL: wdwn- no acute distress  RESPIRATORY: No stridor. COMMUNICATION :  Normal voice  MENTAL STATUS: Alert and oriented x3  HEAD AND FACE: No skin lesions of the face. EXTERNAL EARS AND NOSE: The external ears and nasal pyramid are normal.  FACIAL MUSCLES: All branches of the facial nerve intact. FACE PALPATION: Zygomatic arches and orbital rims are intact. No tenderness over the sinuses. EXTRAOCULAR MUSCLES: Intact with full range of motion. OTOSCOPY:  Normal tympanic membranes, middle ear spaces, and external auditory canals. TUNING FORKS:  Rinne ++ Yang midline at 512 Hz  INTRANASAL:  Septum midline, turbinates normal, meati clear. Bilateral nasal vestibulitis. LIPS, TEETH, GINGIVA:  Normal mucosa  PHARYNX:  Normal  NECK:  No masses. LYMPH NODES: No cervical lymphadenopathy. SALIVARY GLANDS: Parotid and submandibular glands normal.  THYROID: No goiter or thyroid nodules palpable. IMPRESSION: Bilateral nasal vestibulitis. PLAN: Mupirocin ointment prescribed to be used 3 times daily for 10 days. Also given patient a sample of nasal/sinus . FOLLOW-UP: As needed.

## 2024-01-30 ENCOUNTER — HOSPITAL ENCOUNTER (OUTPATIENT)
Dept: GENERAL RADIOLOGY | Age: 84
Discharge: HOME | End: 2024-01-30
Payer: MEDICARE

## 2024-01-30 DIAGNOSIS — M25.512 LEFT SHOULDER PAIN, UNSPECIFIED CHRONICITY: ICD-10-CM

## 2024-01-30 PROCEDURE — 73030 X-RAY EXAM OF SHOULDER: CPT

## 2025-04-01 ENCOUNTER — TRANSCRIBE ORDERS (OUTPATIENT)
Dept: ADMINISTRATIVE | Age: 85
End: 2025-04-01

## 2025-04-01 DIAGNOSIS — M51.369 DEGENERATION OF INTERVERTEBRAL DISC OF LUMBAR REGION WITHOUT DISCOGENIC BACK PAIN OR LOWER EXTREMITY PAIN: Primary | ICD-10-CM

## 2025-04-04 ENCOUNTER — HOSPITAL ENCOUNTER (OUTPATIENT)
Age: 85
Discharge: HOME | End: 2025-04-04
Payer: MEDICARE

## 2025-04-04 DIAGNOSIS — M51.369 DEGENERATION OF INTERVERTEBRAL DISC OF LUMBAR REGION WITHOUT DISCOGENIC BACK PAIN OR LOWER EXTREMITY PAIN: ICD-10-CM

## 2025-04-04 PROCEDURE — 76014 MR SFTY IMPLT&/FB ASMT STF 1: CPT

## 2025-04-04 NOTE — PROGRESS NOTES
Signed         Started researching patient pacemaker at 0701.     Implant form was received from scheduling showing  make and model with corresponding model numbers. This technologist verified information from patient's history in chart. Went to  website to look up current generator and lead MRI conditions. Patient currently has [employer] X4 Pulse Generator (model # U228), 2 Ingevity MRI leads (RA lead is 7741 & RV lead is 7742), and 1 Acuity X4 Straight lead (LV lead is [id number]). Cardiologist form faxed to cardiologist for clearance for this patient scans.  All Documentation has been placed in .       Research finished at 0713.     Start Research:                      0701  Finished Research:                0713  Total Research Time:             12 minutes

## 2025-04-11 ENCOUNTER — HOSPITAL ENCOUNTER (OUTPATIENT)
Dept: MRI IMAGING | Age: 85
Discharge: HOME | End: 2025-04-11
Attending: INTERNAL MEDICINE
Payer: MEDICARE

## 2025-04-11 VITALS — HEART RATE: 70 BPM | SYSTOLIC BLOOD PRESSURE: 105 MMHG | DIASTOLIC BLOOD PRESSURE: 55 MMHG

## 2025-04-11 DIAGNOSIS — M51.369 DEGENERATION OF INTERVERTEBRAL DISC OF LUMBAR REGION WITHOUT DISCOGENIC BACK PAIN OR LOWER EXTREMITY PAIN: ICD-10-CM

## 2025-04-11 PROCEDURE — 72148 MRI LUMBAR SPINE W/O DYE: CPT

## 2025-04-11 NOTE — PROGRESS NOTES
Pt with pacemaker arrives for MRI. Pts ECG monitored during MRI. Pt placed in MRI protection mode during scan. VS in flowsheets. Pt placed back into pre-scan pacemaker settings after MRI. Pt tolerated well.

## (undated) DEVICE — PADDING CAST N ADH 12X6 IN CRIMPED FINISH 100% COTTON WBRLII

## (undated) DEVICE — SST TWIST DRILL, STANDARD, 3.2MM DIA. X 127MM: Brand: MICROAIRE®

## (undated) DEVICE — COVER LT HNDL BLU PLAS

## (undated) DEVICE — GLOVE SURG SZ 85 L12IN FNGR THK13MIL BRN LTX SYN POLYMER W

## (undated) DEVICE — SUTURE VCRL SZ 0 L18IN ABSRB UD L36MM CT-1 1/2 CIR J840D

## (undated) DEVICE — 3M™ IOBAN™ 2 ANTIMICROBIAL INCISE DRAPE 6640EZ: Brand: IOBAN™ 2

## (undated) DEVICE — SYRINGE IRRIG 60ML SFT PLIABLE BLB EZ TO GRP 1 HND USE W/

## (undated) DEVICE — E-Z CLEAN, NON-STICK, PTFE COATED, ELECTROSURGICAL BLADE ELECTRODE, 2.5 INCH (6.35 CM): Brand: EZ CLEAN

## (undated) DEVICE — 3M™ WARMING BLANKET, UPPER BODY, 10 PER CASE, 42268: Brand: BAIR HUGGER™

## (undated) DEVICE — Z INACTIVE NO SUPPLIER SOLUTIONIRRIG 3000ML 0.9% SOD CHL FLX CONT [79720808] [HOSPIRA WORLDWIDE INC]

## (undated) DEVICE — SYSTEM SKIN CLSR 22CM DERMBND PRINEO

## (undated) DEVICE — GLOVE SURG SZ 85 L12IN FNGR THK79MIL GRN LTX FREE

## (undated) DEVICE — SOLUTION IV 100ML 0.9% SOD CHL PLAS CONT USP VIAFLX 1 PER

## (undated) DEVICE — BIPOLAR SEALER 23-112-1 AQM 6.0: Brand: AQUAMANTYS ®

## (undated) DEVICE — SURGICAL SET UP - SURE SET: Brand: MEDLINE INDUSTRIES, INC.

## (undated) DEVICE — GARMENT,MEDLINE,DVT,INT,CALF,MED, GEN2: Brand: MEDLINE

## (undated) DEVICE — TURNOVER KIT RM INF CTRL TECH

## (undated) DEVICE — NEEDLE SPNL L3.5IN PNK HUB S STL REG WALL FIT STYL W/ QNCKE

## (undated) DEVICE — APPLICATOR PREP 26ML 0.7% IOD POVACRYLEX 74% ISO ALC ST

## (undated) DEVICE — SOLUTION IV 1000ML 0.9% SOD CHL

## (undated) DEVICE — BOWL AND CEMENT CARTRIDGE WITH BREAKAWAY FEMORAL NOZZLE AND MEDIUM PRESSURIZER: Brand: ACM

## (undated) DEVICE — GOWN,SIRUS,POLYRNF,BRTHSLV,XL,30/CS: Brand: MEDLINE

## (undated) DEVICE — 3M™ STERI-DRAPE™ INSTRUMENT POUCH 1018: Brand: STERI-DRAPE™

## (undated) DEVICE — ZIMMER® STERILE DISPOSABLE TOURNIQUET CUFF WITH PLC, SINGLE PORT, SINGLE BLADDER, 34 IN. (86 CM)

## (undated) DEVICE — SST BUR, WIRE PASS DRILL, 2 FLUTES, MED., 2MM DIA.: Brand: MICROAIRE®

## (undated) DEVICE — KNEE HOLDER DISPOSABLE LINER: Brand: ALVARADO®  KNEE SUPPORT

## (undated) DEVICE — SUTURE STRATAFIX SPRL SZ 1 L14IN ABSRB VLT L48CM CTX 1/2 SXPD2B405

## (undated) DEVICE — HIGH FLOW TIP

## (undated) DEVICE — DRESSING W4XL8IN ISLANDTHERABOND 3D

## (undated) DEVICE — 2108 SERIES SAGITTAL BLADE FAN, OFFSET  (29.0 X 0.89 X 73.0MM)

## (undated) DEVICE — HANDPIECE SUCTION TUBING INTERPULSE 10FT

## (undated) DEVICE — SUTURE VCRL SZ 1 L18IN ABSRB UD L36MM CT-1 1/2 CIR J841D

## (undated) DEVICE — GLOVE SURG SZ 6 THK91MIL LTX FREE SYN POLYISOPRENE ANTI

## (undated) DEVICE — BLADE SAW RECIP HVY DUTY LNG 27796327] STRYKER CORP]

## (undated) DEVICE — SURE SET-DOUBLE BASIN-LF: Brand: MEDLINE INDUSTRIES, INC.

## (undated) DEVICE — 3M™ COBAN™ NL STERILE NON-LATEX SELF-ADHERENT WRAP, 2086S, 6 IN X 5 YD (15 CM X 4,5 M), 12 ROLLS/CASE: Brand: 3M™ COBAN™

## (undated) DEVICE — SUTURE MCRYL SZ 4-0 L27IN ABSRB UD L19MM PS-2 1/2 CIR PRIM Y426H

## (undated) DEVICE — DECANTER BAG 9": Brand: MEDLINE INDUSTRIES, INC.

## (undated) DEVICE — STERILE PVP: Brand: MEDLINE INDUSTRIES, INC.

## (undated) DEVICE — PLATE ES AD W 9FT CRD 2

## (undated) DEVICE — 3M™ STERI-DRAPE™ U-DRAPE 1015: Brand: STERI-DRAPE™

## (undated) DEVICE — YANKAUER SUCTION INSTRUMENT WITHOUT CONTROL VENT, OPEN TIP, CLEAR: Brand: YANKAUER

## (undated) DEVICE — ATTUNE SOLO PINNING SYSTEM

## (undated) DEVICE — GLOVE SURG SZ 6 L12IN FNGR THK79MIL GRN LTX FREE

## (undated) DEVICE — SUTURE VCRL SZ 2-0 L18IN ABSRB UD CT-1 L36MM 1/2 CIR J839D

## (undated) DEVICE — PEEL-AWAY HOOD: Brand: FLYTE, SURGICOOL

## (undated) DEVICE — PACK PROCEDURE SURG TOTAL KNEE

## (undated) DEVICE — 2108 SERIES SAGITTAL BLADE (19.5 X 1.27 X 90.0MM)

## (undated) DEVICE — ORTHO PRE OP PACK: Brand: MEDLINE INDUSTRIES, INC.